# Patient Record
Sex: MALE | Race: WHITE | Employment: OTHER | URBAN - METROPOLITAN AREA
[De-identification: names, ages, dates, MRNs, and addresses within clinical notes are randomized per-mention and may not be internally consistent; named-entity substitution may affect disease eponyms.]

---

## 2017-01-23 RX ORDER — PRAVASTATIN SODIUM 20 MG
TABLET ORAL
Qty: 30 TABLET | Refills: 3 | Status: SHIPPED | OUTPATIENT
Start: 2017-01-23 | End: 2017-06-22

## 2017-01-23 RX ORDER — ESCITALOPRAM OXALATE 10 MG/1
TABLET ORAL
Qty: 90 TABLET | Refills: 3 | Status: SHIPPED | OUTPATIENT
Start: 2017-01-23 | End: 2018-03-12

## 2017-01-23 RX ORDER — LOSARTAN POTASSIUM 50 MG/1
50 TABLET ORAL DAILY
Qty: 90 TABLET | Refills: 0 | Status: SHIPPED | OUTPATIENT
Start: 2017-01-23 | End: 2017-05-05 | Stop reason: ALTCHOICE

## 2017-01-23 NOTE — TELEPHONE ENCOUNTER
Last Office Visit: 6-7-16 with SD for post-op  Last Rx Filled:   Losartan 4-28-16 90 days with no refills   Pravastatin 9-22-16 30 days with 3 refills   Lexapro 9-22-16 90 days with 3 refills   Last Labs: 5-16-16 lipid/bmp  Future Appointment: None     Per

## 2017-01-24 DIAGNOSIS — E78.5 DYSLIPIDEMIA: Primary | ICD-10-CM

## 2017-01-24 RX ORDER — PRAVASTATIN SODIUM 20 MG
TABLET ORAL
Qty: 30 TABLET | Refills: 3 | OUTPATIENT
Start: 2017-01-24

## 2017-01-24 RX ORDER — LOSARTAN POTASSIUM 50 MG/1
TABLET ORAL
Qty: 30 TABLET | Refills: 3 | OUTPATIENT
Start: 2017-01-24

## 2017-01-24 NOTE — TELEPHONE ENCOUNTER
Per protocol; Failed - Route to Provider     Patient is due for an office visit for follow up and repeat lipids   Also routed to  for scheduling a follow up and reminder of labs to be done .

## 2017-01-25 NOTE — TELEPHONE ENCOUNTER
Please clarify with patient. This is the second or third request from [facility]. I saw his wife wanted them sent to [facility] as it was easier to transfer when in [place]. They were sent to [facility].

## 2017-01-25 NOTE — TELEPHONE ENCOUNTER
Patient was left a voicemail to call back and clarify if patient wanted refills to be sent to [facility] , due to traveling reasons or to [address] [place] [address] since requests keep coming in from [address] [place] [address].

## 2017-01-25 NOTE — TELEPHONE ENCOUNTER
Per protocol;  Failed - Route to Provider     Due for repeat lipids as well as scheduling for follow up   Labs pending  Also routed to [employer] for scheduling

## 2017-01-26 ENCOUNTER — TELEPHONE (OUTPATIENT)
Dept: INTERNAL MEDICINE CLINIC | Facility: CLINIC | Age: 67
End: 2017-01-26

## 2017-01-26 DIAGNOSIS — Z00.00 ROUTINE GENERAL MEDICAL EXAMINATION AT A HEALTH CARE FACILITY: Primary | ICD-10-CM

## 2017-01-26 DIAGNOSIS — Z13.0 SCREENING FOR ENDOCRINE, METABOLIC AND IMMUNITY DISORDER: ICD-10-CM

## 2017-01-26 DIAGNOSIS — Z13.0 SCREENING FOR DISORDER OF BLOOD AND BLOOD-FORMING ORGANS: ICD-10-CM

## 2017-01-26 DIAGNOSIS — Z13.29 SCREENING FOR ENDOCRINE, METABOLIC AND IMMUNITY DISORDER: ICD-10-CM

## 2017-01-26 DIAGNOSIS — Z13.220 SCREENING FOR LIPID DISORDERS: ICD-10-CM

## 2017-01-26 DIAGNOSIS — Z13.228 SCREENING FOR ENDOCRINE, METABOLIC AND IMMUNITY DISORDER: ICD-10-CM

## 2017-01-26 DIAGNOSIS — Z13.29 SCREENING FOR THYROID DISORDER: ICD-10-CM

## 2017-01-26 RX ORDER — PRAVASTATIN SODIUM 20 MG
TABLET ORAL
Qty: 30 TABLET | Refills: 3 | OUTPATIENT
Start: 2017-01-26

## 2017-01-26 RX ORDER — LOSARTAN POTASSIUM 50 MG/1
TABLET ORAL
Qty: 30 TABLET | Refills: 0 | OUTPATIENT
Start: 2017-01-26

## 2017-01-27 RX ORDER — LOSARTAN POTASSIUM 50 MG/1
TABLET ORAL
Qty: 30 TABLET | Refills: 0 | OUTPATIENT
Start: 2017-01-27

## 2017-01-27 RX ORDER — PRAVASTATIN SODIUM 20 MG
TABLET ORAL
Qty: 30 TABLET | Refills: 3 | OUTPATIENT
Start: 2017-01-27

## 2017-01-27 NOTE — TELEPHONE ENCOUNTER
Per protocol;  Failed - Route to Provider    Patient is due for Follow up in office before refill and due for Lipids recheck (labs already pending to be completed)

## 2017-01-27 NOTE — TELEPHONE ENCOUNTER
Please see all previous encounters.  3rd or 4th request.   Refused as these were sent to [facility] per patient request.  Please call [facility] and let them know to  Cancel this request.

## 2017-01-27 NOTE — TELEPHONE ENCOUNTER
Future Appointments  Date Time Provider Calixto Lerma   5/1/2017 8:30 AM Mihai Zacarias MD [facility]     Please send all orders to [facility]. Aware to fast, no cb required.

## 2017-04-24 RX ORDER — LOSARTAN POTASSIUM 25 MG/1
TABLET ORAL
Qty: 90 TABLET | Refills: 1 | Status: SHIPPED | OUTPATIENT
Start: 2017-04-24 | End: 2017-10-12

## 2017-05-04 ENCOUNTER — LAB ENCOUNTER (OUTPATIENT)
Dept: LAB | Age: 67
End: 2017-05-04
Attending: INTERNAL MEDICINE
Payer: MEDICARE

## 2017-05-04 DIAGNOSIS — Z13.29 SCREENING FOR THYROID DISORDER: ICD-10-CM

## 2017-05-04 DIAGNOSIS — Z13.0 SCREENING FOR ENDOCRINE, METABOLIC AND IMMUNITY DISORDER: ICD-10-CM

## 2017-05-04 DIAGNOSIS — Z13.220 SCREENING FOR LIPID DISORDERS: ICD-10-CM

## 2017-05-04 DIAGNOSIS — Z13.0 SCREENING FOR DISORDER OF BLOOD AND BLOOD-FORMING ORGANS: ICD-10-CM

## 2017-05-04 DIAGNOSIS — Z13.29 SCREENING FOR ENDOCRINE, METABOLIC AND IMMUNITY DISORDER: ICD-10-CM

## 2017-05-04 DIAGNOSIS — Z00.00 ROUTINE GENERAL MEDICAL EXAMINATION AT A HEALTH CARE FACILITY: ICD-10-CM

## 2017-05-04 DIAGNOSIS — Z13.228 SCREENING FOR ENDOCRINE, METABOLIC AND IMMUNITY DISORDER: ICD-10-CM

## 2017-05-04 PROCEDURE — 84443 ASSAY THYROID STIM HORMONE: CPT

## 2017-05-04 PROCEDURE — 85025 COMPLETE CBC W/AUTO DIFF WBC: CPT

## 2017-05-04 PROCEDURE — 80061 LIPID PANEL: CPT

## 2017-05-04 PROCEDURE — 80053 COMPREHEN METABOLIC PANEL: CPT

## 2017-05-04 PROCEDURE — 36415 COLL VENOUS BLD VENIPUNCTURE: CPT

## 2017-05-05 ENCOUNTER — OFFICE VISIT (OUTPATIENT)
Dept: INTERNAL MEDICINE CLINIC | Facility: CLINIC | Age: 67
End: 2017-05-05

## 2017-05-05 VITALS
BODY MASS INDEX: 30.92 KG/M2 | HEART RATE: 72 BPM | RESPIRATION RATE: 16 BRPM | SYSTOLIC BLOOD PRESSURE: 118 MMHG | DIASTOLIC BLOOD PRESSURE: 70 MMHG | TEMPERATURE: 98 F | WEIGHT: 216 LBS | HEIGHT: 70 IN

## 2017-05-05 DIAGNOSIS — E78.00 HYPERCHOLESTEREMIA: ICD-10-CM

## 2017-05-05 DIAGNOSIS — I10 ESSENTIAL HYPERTENSION: ICD-10-CM

## 2017-05-05 DIAGNOSIS — Z00.00 ROUTINE GENERAL MEDICAL EXAMINATION AT A HEALTH CARE FACILITY: Primary | ICD-10-CM

## 2017-05-05 DIAGNOSIS — Z13.6 SCREENING FOR AAA (ABDOMINAL AORTIC ANEURYSM): ICD-10-CM

## 2017-05-05 PROCEDURE — G0402 INITIAL PREVENTIVE EXAM: HCPCS | Performed by: INTERNAL MEDICINE

## 2017-05-05 PROCEDURE — G0009 ADMIN PNEUMOCOCCAL VACCINE: HCPCS | Performed by: INTERNAL MEDICINE

## 2017-05-05 PROCEDURE — 90670 PCV13 VACCINE IM: CPT | Performed by: INTERNAL MEDICINE

## 2017-05-05 PROCEDURE — 99213 OFFICE O/P EST LOW 20 MIN: CPT | Performed by: INTERNAL MEDICINE

## 2017-05-05 NOTE — PROGRESS NOTES
HPI:    Patient ID: Liana Chu is a 74year old male.     HPI  Here for welcome to medicare, see form, hyperglycemia, htn, hypercholesterolemia, feels well, active, no complaints  /70 mmHg  Pulse 72  Temp(Src) 98.3 °F (36.8 °C) (Oral)  Resp 16 penile tenderness. Musculoskeletal: He exhibits no edema. Neurological: He is oriented to person, place, and time. No cranial nerve deficit. Skin: Skin is warm. He is not diaphoretic. Psychiatric: He has a normal mood and affect.               ASSES

## 2017-06-22 RX ORDER — PRAVASTATIN SODIUM 20 MG
TABLET ORAL
Qty: 30 TABLET | Refills: 5 | Status: SHIPPED | OUTPATIENT
Start: 2017-06-22 | End: 2017-12-18

## 2017-06-23 NOTE — TELEPHONE ENCOUNTER
Pharmacy paged requesting refill now since patient is leaving on a trip. Rx sent to listed pharmacy.

## 2017-08-30 ENCOUNTER — OFFICE VISIT (OUTPATIENT)
Dept: FAMILY MEDICINE CLINIC | Facility: CLINIC | Age: 67
End: 2017-08-30

## 2017-08-30 VITALS
HEIGHT: 70 IN | SYSTOLIC BLOOD PRESSURE: 124 MMHG | TEMPERATURE: 99 F | RESPIRATION RATE: 18 BRPM | BODY MASS INDEX: 30.92 KG/M2 | WEIGHT: 216 LBS | OXYGEN SATURATION: 98 % | HEART RATE: 97 BPM | DIASTOLIC BLOOD PRESSURE: 80 MMHG

## 2017-08-30 DIAGNOSIS — J06.9 VIRAL URI WITH COUGH: Primary | ICD-10-CM

## 2017-08-30 PROCEDURE — 99213 OFFICE O/P EST LOW 20 MIN: CPT | Performed by: NURSE PRACTITIONER

## 2017-08-30 RX ORDER — FLUTICASONE PROPIONATE 50 MCG
1 SPRAY, SUSPENSION (ML) NASAL 2 TIMES DAILY
Qty: 1 BOTTLE | Refills: 1 | Status: SHIPPED | OUTPATIENT
Start: 2017-08-30 | End: 2017-09-29

## 2017-08-30 NOTE — PROGRESS NOTES
HPI:   Noe Kumar is a 74year old male who presents with ill symptoms for  2  days. Patient was on [place] cruise and developed chills/fever and URI symptoms while on the boat.  He improved with Robitussin and rest. Today he has mild nonproductive c • Cancer Mother    • Heart Attack Mother    • Breast Cancer Sister       Smoking status: Former Smoker                                                              Packs/day: 0.00      Years: 0.00         Quit date: 5/20/1987  Smokeless tobacco: Never Us blood pressure during use. · Salt water gargles (1 tsp. Salt in 6 oz lukewarm water), use several times daily to help decrease swelling and pain. · Saline nasal spray to nostrils if needed to help remove drainage or congestion in nose.    · Hydrate! (cold

## 2017-08-30 NOTE — PATIENT INSTRUCTIONS
Please start Flonase 1 spray each nostril twice daily before brushing teeth. Use for at least one week. May stop if improving. Restart if symptoms return. · May continue Robitussin if needed for cough. Watch blood pressure during use.   · Salt water gargl

## 2017-09-05 ENCOUNTER — OFFICE VISIT (OUTPATIENT)
Dept: INTERNAL MEDICINE CLINIC | Facility: CLINIC | Age: 67
End: 2017-09-05

## 2017-09-05 VITALS
WEIGHT: 213 LBS | HEIGHT: 69 IN | DIASTOLIC BLOOD PRESSURE: 82 MMHG | SYSTOLIC BLOOD PRESSURE: 130 MMHG | BODY MASS INDEX: 31.55 KG/M2 | TEMPERATURE: 99 F | OXYGEN SATURATION: 98 % | HEART RATE: 81 BPM

## 2017-09-05 DIAGNOSIS — R05.9 COUGH: ICD-10-CM

## 2017-09-05 DIAGNOSIS — J06.9 ACUTE URI: Primary | ICD-10-CM

## 2017-09-05 PROCEDURE — 99213 OFFICE O/P EST LOW 20 MIN: CPT | Performed by: NURSE PRACTITIONER

## 2017-09-05 RX ORDER — AZITHROMYCIN 250 MG/1
TABLET, FILM COATED ORAL
Qty: 6 TABLET | Refills: 0 | Status: SHIPPED | OUTPATIENT
Start: 2017-09-05 | End: 2017-12-20 | Stop reason: ALTCHOICE

## 2017-09-05 RX ORDER — PREDNISONE 20 MG/1
20 TABLET ORAL DAILY
Qty: 5 TABLET | Refills: 0 | Status: SHIPPED | OUTPATIENT
Start: 2017-09-05 | End: 2017-09-12

## 2017-09-05 NOTE — PROGRESS NOTES
Marichuy Reyna is a 74year old male. Patient presents with:  Cough: for about 1 week. He went to walk in clinic and was given flonase and was told to take delsym otc but its not helping       HPI:   Presents for eval of cough and chest congestion.   See tablet Rfl: 3   aspirin 81 MG Oral Tab EC Take 81 mg by mouth daily.  Disp:  Rfl:       Past Medical History:   Diagnosis Date   • Actinic keratosis    • ANXIETY    • Anxiety state, unspecified    • BACK PAIN    • Basal cell carcinoma    • Bronchitis, not s respiratory distress, lungs clear to auscultation   Slight end expiratory wheezing. CARDIO: RRR without murmur  PSYCH: alert and oriented x 3    ASSESSMENT AND PLAN:     Acute uri  (primary encounter diagnosis)  zithromax and prednisone.   Call with unre

## 2017-10-13 RX ORDER — LOSARTAN POTASSIUM 25 MG/1
TABLET ORAL
Qty: 90 TABLET | Refills: 0 | Status: SHIPPED | OUTPATIENT
Start: 2017-10-13 | End: 2017-12-18

## 2017-12-18 RX ORDER — LOSARTAN POTASSIUM 25 MG/1
TABLET ORAL
Qty: 90 TABLET | Refills: 1 | Status: SHIPPED | OUTPATIENT
Start: 2017-12-18 | End: 2018-06-09

## 2017-12-18 RX ORDER — PRAVASTATIN SODIUM 20 MG
TABLET ORAL
Qty: 30 TABLET | Refills: 5 | Status: SHIPPED | OUTPATIENT
Start: 2017-12-18 | End: 2017-12-19

## 2017-12-19 ENCOUNTER — TELEPHONE (OUTPATIENT)
Dept: INTERNAL MEDICINE CLINIC | Facility: CLINIC | Age: 67
End: 2017-12-19

## 2017-12-19 RX ORDER — PRAVASTATIN SODIUM 20 MG
20 TABLET ORAL NIGHTLY
Qty: 90 TABLET | Refills: 1 | Status: SHIPPED | OUTPATIENT
Start: 2017-12-19 | End: 2018-12-12

## 2017-12-19 NOTE — TELEPHONE ENCOUNTER
Refills for pravastatin was filled 12-18-17 30 tabs with 5 refills so I resent it as 90 days with 1 refill

## 2017-12-19 NOTE — TELEPHONE ENCOUNTER
Patient is scheduled for an appt with Shashi Maddox, he would like to cancel that appt. but he would like to increase his   escitalopram 10 MG Oral Tab.   I told him that I thought that he would need to see a Dr in order to do that but he would like

## 2017-12-20 NOTE — PROGRESS NOTES
Patient presents with:  Medication Follow-Up: Room 2. He would like to talk about getting off the lexapro.  He was taking it while he was working and had some stress and he is no longer working so he would like to stop it      HPI:  Pt presents with c/o inc claudication     Thoracic or lumbosacral neuritis or radiculitis, unspecified     Encounter for long-term (current) use of other medications     Diverticulosis of colon (without mention of hemorrhage)     Sciatica     Allergic rhinitis, cause unspecified anxiety. At that point he will need to return and we will need to restart Lexapro or change med or increase dose. Pt voiced understanding. Essential hypertension - Controlled. Continue current meds. Hypercholesteremia - Recheck labs.   Continue Pravast

## 2017-12-20 NOTE — PATIENT INSTRUCTIONS
Take 1/2 tablet of Lexapro (5 mg) daily for 2 weeks, then take 1/2 tablet of Lexapro every other day for 2 weeks then STOP.

## 2018-01-22 ENCOUNTER — LAB ENCOUNTER (OUTPATIENT)
Dept: LAB | Age: 68
End: 2018-01-22
Attending: PHYSICIAN ASSISTANT
Payer: MEDICARE

## 2018-01-22 DIAGNOSIS — R73.9 HYPERGLYCEMIA: Primary | ICD-10-CM

## 2018-01-22 DIAGNOSIS — R45.1 AGITATION: ICD-10-CM

## 2018-01-22 DIAGNOSIS — R73.9 HYPERGLYCEMIA: ICD-10-CM

## 2018-01-22 DIAGNOSIS — F41.9 ANXIETY: ICD-10-CM

## 2018-01-22 DIAGNOSIS — I10 ESSENTIAL HYPERTENSION: ICD-10-CM

## 2018-01-22 DIAGNOSIS — E78.00 HYPERCHOLESTEREMIA: ICD-10-CM

## 2018-01-22 LAB
ALBUMIN SERPL-MCNC: 3.9 G/DL (ref 3.5–4.8)
ALP LIVER SERPL-CCNC: 55 U/L (ref 45–117)
ALT SERPL-CCNC: 33 U/L (ref 17–63)
AST SERPL-CCNC: 20 U/L (ref 15–41)
BASOPHILS # BLD AUTO: 0.05 X10(3) UL (ref 0–0.1)
BASOPHILS NFR BLD AUTO: 0.9 %
BILIRUB SERPL-MCNC: 0.5 MG/DL (ref 0.1–2)
BUN BLD-MCNC: 16 MG/DL (ref 8–20)
CALCIUM BLD-MCNC: 8.7 MG/DL (ref 8.3–10.3)
CHLORIDE: 106 MMOL/L (ref 101–111)
CHOLEST SMN-MCNC: 199 MG/DL (ref ?–200)
CO2: 30 MMOL/L (ref 22–32)
CREAT BLD-MCNC: 0.87 MG/DL (ref 0.7–1.3)
EOSINOPHIL # BLD AUTO: 0.24 X10(3) UL (ref 0–0.3)
EOSINOPHIL NFR BLD AUTO: 4.3 %
ERYTHROCYTE [DISTWIDTH] IN BLOOD BY AUTOMATED COUNT: 11.9 % (ref 11.5–16)
EST. AVERAGE GLUCOSE BLD GHB EST-MCNC: 105 MG/DL (ref 68–126)
GLUCOSE BLD-MCNC: 100 MG/DL (ref 70–99)
HBA1C MFR BLD HPLC: 5.3 % (ref ?–5.7)
HCT VFR BLD AUTO: 42.1 % (ref 37–53)
HDLC SERPL-MCNC: 53 MG/DL (ref 45–?)
HDLC SERPL: 3.75 {RATIO} (ref ?–4.97)
HGB BLD-MCNC: 14.9 G/DL (ref 13–17)
IMMATURE GRANULOCYTE COUNT: 0.02 X10(3) UL (ref 0–1)
IMMATURE GRANULOCYTE RATIO %: 0.4 %
LDLC SERPL CALC-MCNC: 124 MG/DL (ref ?–130)
LYMPHOCYTES # BLD AUTO: 2.02 X10(3) UL (ref 0.9–4)
LYMPHOCYTES NFR BLD AUTO: 36.1 %
M PROTEIN MFR SERPL ELPH: 6.9 G/DL (ref 6.1–8.3)
MCH RBC QN AUTO: 33.7 PG (ref 27–33.2)
MCHC RBC AUTO-ENTMCNC: 35.4 G/DL (ref 31–37)
MCV RBC AUTO: 95.2 FL (ref 80–99)
MONOCYTES # BLD AUTO: 0.61 X10(3) UL (ref 0.1–0.6)
MONOCYTES NFR BLD AUTO: 10.9 %
NEUTROPHIL ABS PRELIM: 2.65 X10 (3) UL (ref 1.3–6.7)
NEUTROPHILS # BLD AUTO: 2.65 X10(3) UL (ref 1.3–6.7)
NEUTROPHILS NFR BLD AUTO: 47.4 %
NONHDLC SERPL-MCNC: 146 MG/DL (ref ?–130)
PLATELET # BLD AUTO: 241 10(3)UL (ref 150–450)
POTASSIUM SERPL-SCNC: 4.3 MMOL/L (ref 3.6–5.1)
RBC # BLD AUTO: 4.42 X10(6)UL (ref 3.8–5.8)
RED CELL DISTRIBUTION WIDTH-SD: 41.4 FL (ref 35.1–46.3)
SODIUM SERPL-SCNC: 143 MMOL/L (ref 136–144)
TRIGL SERPL-MCNC: 110 MG/DL (ref ?–150)
TSI SER-ACNC: 2.78 MIU/ML (ref 0.35–5.5)
VLDLC SERPL CALC-MCNC: 22 MG/DL (ref 5–40)
WBC # BLD AUTO: 5.6 X10(3) UL (ref 4–13)

## 2018-01-22 PROCEDURE — 84443 ASSAY THYROID STIM HORMONE: CPT

## 2018-01-22 PROCEDURE — 36415 COLL VENOUS BLD VENIPUNCTURE: CPT

## 2018-01-22 PROCEDURE — 85025 COMPLETE CBC W/AUTO DIFF WBC: CPT

## 2018-01-22 PROCEDURE — 80053 COMPREHEN METABOLIC PANEL: CPT

## 2018-01-22 PROCEDURE — 83036 HEMOGLOBIN GLYCOSYLATED A1C: CPT

## 2018-01-22 PROCEDURE — 80061 LIPID PANEL: CPT

## 2018-01-22 NOTE — PROGRESS NOTES
TSH normal.  CBC OK. CMP OK other than mildly elevated gluc. Chol stable. A1c added to blood in lab. Will comment on A1c when result known.

## 2018-01-22 NOTE — PROGRESS NOTES
A1c is normal.  Therefore despite mild elevated in fasting blood sugar his 3 mos average blood sugar is normal.

## 2018-01-23 ENCOUNTER — TELEPHONE (OUTPATIENT)
Dept: INTERNAL MEDICINE CLINIC | Facility: CLINIC | Age: 68
End: 2018-01-23

## 2018-01-23 NOTE — TELEPHONE ENCOUNTER
Patient called and would like his lab results documented to my chart. He just signed on yesterday. He did receive his results via phone. He would also like to  a hard copy because he is going out of town.   I have copied them and put them up fro

## 2018-03-12 RX ORDER — ESCITALOPRAM OXALATE 10 MG/1
TABLET ORAL
Qty: 90 TABLET | Refills: 0 | Status: SHIPPED | OUTPATIENT
Start: 2018-03-12 | End: 2018-06-09

## 2018-03-12 NOTE — TELEPHONE ENCOUNTER
Medication(s) to Refill:   Pending Prescriptions Disp Refills    ESCITALOPRAM 10 MG Oral Tab [Pharmacy Med Name: ESCITALOPRAM 10MG TABLETS] 90 tablet 0     Sig: TAKE 1 TABLET(10 MG) BY MOUTH EVERY DAY             Reason for Medication Refill being sent to

## 2018-03-20 ENCOUNTER — APPOINTMENT (OUTPATIENT)
Dept: GENERAL RADIOLOGY | Age: 68
End: 2018-03-20
Payer: MEDICARE

## 2018-03-20 ENCOUNTER — HOSPITAL ENCOUNTER (EMERGENCY)
Age: 68
Discharge: HOME | End: 2018-03-20
Attending: EMERGENCY MEDICINE
Payer: MEDICARE

## 2018-03-20 ENCOUNTER — APPOINTMENT (OUTPATIENT)
Dept: CV DIAGNOSTICS | Age: 68
End: 2018-03-20
Attending: EMERGENCY MEDICINE
Payer: MEDICARE

## 2018-03-20 VITALS
SYSTOLIC BLOOD PRESSURE: 133 MMHG | BODY MASS INDEX: 31.1 KG/M2 | TEMPERATURE: 98 F | WEIGHT: 210 LBS | HEART RATE: 77 BPM | DIASTOLIC BLOOD PRESSURE: 77 MMHG | RESPIRATION RATE: 18 BRPM | HEIGHT: 69 IN | OXYGEN SATURATION: 99 %

## 2018-03-20 DIAGNOSIS — R07.89 CHEST PAIN, ATYPICAL: Primary | ICD-10-CM

## 2018-03-20 LAB
ALBUMIN SERPL-MCNC: 4.2 G/DL (ref 3.5–4.8)
ALP LIVER SERPL-CCNC: 57 U/L (ref 45–117)
ALT SERPL-CCNC: 28 U/L (ref 17–63)
AST SERPL-CCNC: 21 U/L (ref 15–41)
ATRIAL RATE: 71 BPM
ATRIAL RATE: 72 BPM
BASOPHILS # BLD AUTO: 0.04 X10(3) UL (ref 0–0.1)
BASOPHILS NFR BLD AUTO: 0.6 %
BILIRUB SERPL-MCNC: 0.6 MG/DL (ref 0.1–2)
BUN BLD-MCNC: 15 MG/DL (ref 8–20)
CALCIUM BLD-MCNC: 8.7 MG/DL (ref 8.3–10.3)
CHLORIDE: 102 MMOL/L (ref 101–111)
CO2: 31 MMOL/L (ref 22–32)
CREAT BLD-MCNC: 0.86 MG/DL (ref 0.7–1.3)
EOSINOPHIL # BLD AUTO: 0.19 X10(3) UL (ref 0–0.3)
EOSINOPHIL NFR BLD AUTO: 2.9 %
ERYTHROCYTE [DISTWIDTH] IN BLOOD BY AUTOMATED COUNT: 12.1 % (ref 11.5–16)
GLUCOSE BLD-MCNC: 99 MG/DL (ref 70–99)
HCT VFR BLD AUTO: 43.8 % (ref 37–53)
HGB BLD-MCNC: 15.6 G/DL (ref 13–17)
IMMATURE GRANULOCYTE COUNT: 0.01 X10(3) UL (ref 0–1)
IMMATURE GRANULOCYTE RATIO %: 0.2 %
LYMPHOCYTES # BLD AUTO: 2.22 X10(3) UL (ref 0.9–4)
LYMPHOCYTES NFR BLD AUTO: 33.5 %
M PROTEIN MFR SERPL ELPH: 7.4 G/DL (ref 6.1–8.3)
MCH RBC QN AUTO: 34.1 PG (ref 27–33.2)
MCHC RBC AUTO-ENTMCNC: 35.6 G/DL (ref 31–37)
MCV RBC AUTO: 95.6 FL (ref 80–99)
MONOCYTES # BLD AUTO: 0.72 X10(3) UL (ref 0.1–1)
MONOCYTES NFR BLD AUTO: 10.9 %
NEUTROPHIL ABS PRELIM: 3.45 X10 (3) UL (ref 1.3–6.7)
NEUTROPHILS # BLD AUTO: 3.45 X10(3) UL (ref 1.3–6.7)
NEUTROPHILS NFR BLD AUTO: 51.9 %
P AXIS: 31 DEGREES
P AXIS: 31 DEGREES
P-R INTERVAL: 156 MS
P-R INTERVAL: 158 MS
PLATELET # BLD AUTO: 264 10(3)UL (ref 150–450)
POTASSIUM SERPL-SCNC: 4.3 MMOL/L (ref 3.6–5.1)
Q-T INTERVAL: 398 MS
Q-T INTERVAL: 404 MS
QRS DURATION: 102 MS
QRS DURATION: 102 MS
QTC CALCULATION (BEZET): 435 MS
QTC CALCULATION (BEZET): 439 MS
R AXIS: -41 DEGREES
R AXIS: -43 DEGREES
RBC # BLD AUTO: 4.58 X10(6)UL (ref 3.8–5.8)
RED CELL DISTRIBUTION WIDTH-SD: 41.5 FL (ref 35.1–46.3)
SODIUM SERPL-SCNC: 138 MMOL/L (ref 136–144)
T AXIS: -3 DEGREES
T AXIS: 21 DEGREES
TROPONIN: <0.046 NG/ML (ref ?–0.05)
TROPONIN: <0.046 NG/ML (ref ?–0.05)
VENTRICULAR RATE: 71 BPM
VENTRICULAR RATE: 72 BPM
WBC # BLD AUTO: 6.6 X10(3) UL (ref 4–13)

## 2018-03-20 PROCEDURE — 80053 COMPREHEN METABOLIC PANEL: CPT | Performed by: EMERGENCY MEDICINE

## 2018-03-20 PROCEDURE — 93017 CV STRESS TEST TRACING ONLY: CPT | Performed by: EMERGENCY MEDICINE

## 2018-03-20 PROCEDURE — 84484 ASSAY OF TROPONIN QUANT: CPT | Performed by: EMERGENCY MEDICINE

## 2018-03-20 PROCEDURE — 93010 ELECTROCARDIOGRAM REPORT: CPT

## 2018-03-20 PROCEDURE — 80053 COMPREHEN METABOLIC PANEL: CPT

## 2018-03-20 PROCEDURE — 71045 X-RAY EXAM CHEST 1 VIEW: CPT | Performed by: EMERGENCY MEDICINE

## 2018-03-20 PROCEDURE — 99285 EMERGENCY DEPT VISIT HI MDM: CPT

## 2018-03-20 PROCEDURE — 84484 ASSAY OF TROPONIN QUANT: CPT

## 2018-03-20 PROCEDURE — 96374 THER/PROPH/DIAG INJ IV PUSH: CPT

## 2018-03-20 PROCEDURE — 85025 COMPLETE CBC W/AUTO DIFF WBC: CPT

## 2018-03-20 PROCEDURE — 93350 STRESS TTE ONLY: CPT | Performed by: EMERGENCY MEDICINE

## 2018-03-20 PROCEDURE — 93005 ELECTROCARDIOGRAM TRACING: CPT

## 2018-03-20 PROCEDURE — 93018 CV STRESS TEST I&R ONLY: CPT | Performed by: EMERGENCY MEDICINE

## 2018-03-20 PROCEDURE — 85025 COMPLETE CBC W/AUTO DIFF WBC: CPT | Performed by: EMERGENCY MEDICINE

## 2018-03-20 RX ORDER — KETOROLAC TROMETHAMINE 30 MG/ML
15 INJECTION, SOLUTION INTRAMUSCULAR; INTRAVENOUS ONCE
Status: COMPLETED | OUTPATIENT
Start: 2018-03-20 | End: 2018-03-20

## 2018-03-20 RX ORDER — ASPIRIN 81 MG/1
162 TABLET, CHEWABLE ORAL ONCE
Status: COMPLETED | OUTPATIENT
Start: 2018-03-20 | End: 2018-03-20

## 2018-03-20 NOTE — PROGRESS NOTES
PRELIMINARY CARDIODIAGNOSTICS REPORT    No ST segment changes noted with exercise. No symptoms. Rare PACs, frequent PVCs with couplets and bigeminal at times. Pt walked for 7:15 on a Timothy protocol achieving a MHR of 134 (87% APMHR). Peak /100.

## 2018-05-16 ENCOUNTER — HOSPITAL ENCOUNTER (OUTPATIENT)
Dept: CT IMAGING | Age: 68
Discharge: HOME | End: 2018-05-16
Attending: INTERNAL MEDICINE

## 2018-05-16 DIAGNOSIS — Z13.9 VISIT FOR SCREENING: ICD-10-CM

## 2018-05-17 ENCOUNTER — TELEPHONE (OUTPATIENT)
Dept: INTERNAL MEDICINE CLINIC | Facility: CLINIC | Age: 68
End: 2018-05-17

## 2018-05-17 ENCOUNTER — HOSPITAL ENCOUNTER (OUTPATIENT)
Dept: CT IMAGING | Facility: HOSPITAL | Age: 68
Discharge: HOME | End: 2018-05-17
Attending: INTERNAL MEDICINE
Payer: MEDICARE

## 2018-05-17 DIAGNOSIS — R93.89 ABNORMAL FINDING ON CT SCAN: ICD-10-CM

## 2018-05-17 PROCEDURE — 71260 CT THORAX DX C+: CPT | Performed by: INTERNAL MEDICINE

## 2018-05-17 PROCEDURE — 82565 ASSAY OF CREATININE: CPT

## 2018-05-17 NOTE — TELEPHONE ENCOUNTER
Patient states he has the CT Chest scheduled for tonight 5/17 and wanted to talk through whether the CT was needed. Discussed his CT overread results. Pt determined he would go ahead and proceed with the CT Chest tonight.   Pt verbalizes understanding and

## 2018-05-17 NOTE — TELEPHONE ENCOUNTER
Pt called and stated that Mp Aldana has him scheduled for a F/U CT Scan of the chest. Pt would like to speak to JL. I informed pt that he would first get a call back from the nurse and he said that he already spoke with the nurse and wants to speak to JL.      Ple

## 2018-05-18 DIAGNOSIS — R73.9 HYPERGLYCEMIA: Primary | ICD-10-CM

## 2018-05-29 ENCOUNTER — LAB ENCOUNTER (OUTPATIENT)
Dept: LAB | Age: 68
End: 2018-05-29
Attending: INTERNAL MEDICINE
Payer: MEDICARE

## 2018-05-29 DIAGNOSIS — R73.9 HYPERGLYCEMIA: ICD-10-CM

## 2018-05-29 PROCEDURE — 36415 COLL VENOUS BLD VENIPUNCTURE: CPT

## 2018-05-29 PROCEDURE — 82947 ASSAY GLUCOSE BLOOD QUANT: CPT

## 2018-05-29 PROCEDURE — 83036 HEMOGLOBIN GLYCOSYLATED A1C: CPT

## 2018-05-30 ENCOUNTER — OFFICE VISIT (OUTPATIENT)
Dept: INTERNAL MEDICINE CLINIC | Facility: CLINIC | Age: 68
End: 2018-05-30

## 2018-05-30 VITALS
WEIGHT: 199.81 LBS | RESPIRATION RATE: 17 BRPM | TEMPERATURE: 98 F | SYSTOLIC BLOOD PRESSURE: 120 MMHG | HEART RATE: 92 BPM | DIASTOLIC BLOOD PRESSURE: 86 MMHG | HEIGHT: 69 IN | BODY MASS INDEX: 29.59 KG/M2

## 2018-05-30 DIAGNOSIS — Z23 NEED FOR VACCINATION: ICD-10-CM

## 2018-05-30 DIAGNOSIS — Z00.00 ROUTINE GENERAL MEDICAL EXAMINATION AT A HEALTH CARE FACILITY: Primary | ICD-10-CM

## 2018-05-30 DIAGNOSIS — Z12.11 SPECIAL SCREENING FOR MALIGNANT NEOPLASMS, COLON: ICD-10-CM

## 2018-05-30 DIAGNOSIS — F41.9 ANXIETY: ICD-10-CM

## 2018-05-30 DIAGNOSIS — I10 ESSENTIAL HYPERTENSION, BENIGN: ICD-10-CM

## 2018-05-30 DIAGNOSIS — E78.00 PURE HYPERCHOLESTEROLEMIA: ICD-10-CM

## 2018-05-30 DIAGNOSIS — Z12.5 PROSTATE CANCER SCREENING: ICD-10-CM

## 2018-05-30 DIAGNOSIS — R91.1 LUNG NODULE < 6CM ON CT: ICD-10-CM

## 2018-05-30 PROCEDURE — 99213 OFFICE O/P EST LOW 20 MIN: CPT | Performed by: INTERNAL MEDICINE

## 2018-05-30 PROCEDURE — G0009 ADMIN PNEUMOCOCCAL VACCINE: HCPCS | Performed by: INTERNAL MEDICINE

## 2018-05-30 PROCEDURE — G0438 PPPS, INITIAL VISIT: HCPCS | Performed by: INTERNAL MEDICINE

## 2018-05-30 PROCEDURE — 90732 PPSV23 VACC 2 YRS+ SUBQ/IM: CPT | Performed by: INTERNAL MEDICINE

## 2018-06-01 NOTE — PROGRESS NOTES
HPI:    Patient ID: Pratik Oviedo is a 79year old male.     HPI  Here for pe, htn, hyperchol, depression controlled, feels well, has worked on wt loss and diet change  /86 (BP Location: Right arm, Patient Position: Sitting, Cuff Size: adult)   Pul tenderness. Genitourinary: Prostate normal and penis normal.   Musculoskeletal: He exhibits no edema. Neurological: He is oriented to person, place, and time. Skin: Skin is warm. He is not diaphoretic. Psychiatric: He has a normal mood and affect.

## 2018-06-11 RX ORDER — LOSARTAN POTASSIUM 25 MG/1
TABLET ORAL
Qty: 90 TABLET | Refills: 0 | Status: SHIPPED | OUTPATIENT
Start: 2018-06-11 | End: 2018-10-10

## 2018-06-11 RX ORDER — ESCITALOPRAM OXALATE 10 MG/1
TABLET ORAL
Qty: 90 TABLET | Refills: 0 | Status: SHIPPED | OUTPATIENT
Start: 2018-06-11 | End: 2018-09-10

## 2018-06-11 NOTE — TELEPHONE ENCOUNTER
Last Office Visit: 5-30-18 with DIANE for CPE  Last Rx Filled: 3-12-18 90 tabs with no refills   Last Labs: 5-29-18 hga1c/glucose  Future Appointment: none    Per protocol to provider

## 2018-06-12 RX ORDER — POLYETHYLENE GLYCOL 3350, SODIUM CHLORIDE, SODIUM BICARBONATE, POTASSIUM CHLORIDE 420; 11.2; 5.72; 1.48 G/4L; G/4L; G/4L; G/4L
POWDER, FOR SOLUTION ORAL
Qty: 1 BOTTLE | Refills: 0 | Status: SHIPPED | OUTPATIENT
Start: 2018-06-12 | End: 2018-08-06 | Stop reason: ALTCHOICE

## 2018-06-19 RX ORDER — LOSARTAN POTASSIUM 25 MG/1
TABLET ORAL
Qty: 90 TABLET | Refills: 0 | OUTPATIENT
Start: 2018-06-19

## 2018-08-06 ENCOUNTER — OFFICE VISIT (OUTPATIENT)
Dept: FAMILY MEDICINE CLINIC | Facility: CLINIC | Age: 68
End: 2018-08-06
Payer: MEDICARE

## 2018-08-06 VITALS
HEIGHT: 68 IN | DIASTOLIC BLOOD PRESSURE: 72 MMHG | BODY MASS INDEX: 29.7 KG/M2 | WEIGHT: 196 LBS | RESPIRATION RATE: 18 BRPM | OXYGEN SATURATION: 98 % | SYSTOLIC BLOOD PRESSURE: 118 MMHG | HEART RATE: 77 BPM | TEMPERATURE: 98 F

## 2018-08-06 DIAGNOSIS — H60.502 ACUTE OTITIS EXTERNA OF LEFT EAR, UNSPECIFIED TYPE: ICD-10-CM

## 2018-08-06 DIAGNOSIS — H61.22 IMPACTED CERUMEN OF LEFT EAR: Primary | ICD-10-CM

## 2018-08-06 PROCEDURE — 69209 REMOVE IMPACTED EAR WAX UNI: CPT | Performed by: NURSE PRACTITIONER

## 2018-08-06 PROCEDURE — 99213 OFFICE O/P EST LOW 20 MIN: CPT | Performed by: NURSE PRACTITIONER

## 2018-08-06 NOTE — PROGRESS NOTES
CHIEF COMPLAINT:   Patient presents with:  Ear Pain: left ear pain x 1 day     HPI:   Clarita Johnson is a 79year old male who presents to clinic today with complaints of left ear pain. Has had for 1 days. Pain is described as discomfort.  Patient denie LUNGS: No shortness of breath, or wheezing. CARDIOVASCULAR: No chest pain, palpitations  GI: No N/V/C/D.   NEURO: denies headaches or dizziness    EXAM:   /72 (BP Location: Right arm, Patient Position: Sitting, Cuff Size: adult)   Pulse 77   Temp 98 Acute otitis externa of left ear, unspecified type  1. Impacted cerumen of left ear  Can use debrox prn. Keep ears dry.      2. Acute otitis externa of left ear, unspecified type  - Neomycin-Polymyxin-HC 3.5-29233-5 Otic Solution; Place 4 drops into the lef Tiny glands in your ear make substances that combine with dead skin cells to form earwax. Earwax helps protect your ear canal from water, dirt, infection, and injury.  Over time, earwax travels from the inner part of your ear canal to the entrance of the ca If you don’t have symptoms, you may not need treatment. Often, the earwax goes away on its own with time. If you have symptoms, you may have one or more treatments such as:  · Eardrops to soften the earwax. This helps it leave the ear over time.   · Rinsing What is this medicine? CARBAMIDE PEROXIDE (CAR parsons mide per OX aurora) is used to soften and help remove ear wax. How should I use this medicine? This medicine is only for use in the outer ear canal. Follow the directions carefully.  Wash hands before and a If you miss a dose, use it as soon as you can. If it is almost time for your next dose, use only that dose. Do not use double or extra doses. Where should I keep my medicine? Keep out of the reach of children.   Store at room temperature between 15 and 30

## 2018-08-06 NOTE — PATIENT INSTRUCTIONS
Impacted Earwax     Inner ear structures including ear canal and eardrum. Impacted earwax is a buildup of the natural wax in the ear (cerumen). Impacted earwax is very common. It can cause symptoms such as hearing loss.  It can also make it difficult Excess earwax usually does not cause any symptoms, unless there is a large amount of buildup.  Then it may cause symptoms such as:  · Hearing loss  · Earache  · Sense of ear fullness  · Itching in the ear  · Odor from the ear  · Ear drainage  · Dizziness  · © 0580-6414 [employer] [address]. [address] [facility], [address]. All rights reserved. This information is not intended as a substitute for professional medical care. Always follow your healthcare professional's instructions.         Chidi Cortes · abnormal sensation while putting the drops in the ear  · temporary reduction in hearing (but not complete loss of hearing)  What may interact with this medicine? Interactions are not expected.  Do not use any other ear products without asking your doctor

## 2018-08-30 ENCOUNTER — APPOINTMENT (OUTPATIENT)
Dept: LAB | Age: 68
End: 2018-08-30
Attending: INTERNAL MEDICINE
Payer: MEDICARE

## 2018-08-30 DIAGNOSIS — Z12.5 PROSTATE CANCER SCREENING: ICD-10-CM

## 2018-08-30 LAB — COMPLEXED PSA SERPL-MCNC: 0.97 NG/ML (ref 0.01–4)

## 2018-08-30 PROCEDURE — 36415 COLL VENOUS BLD VENIPUNCTURE: CPT

## 2018-09-10 RX ORDER — ESCITALOPRAM OXALATE 10 MG/1
TABLET ORAL
Qty: 90 TABLET | Refills: 3 | Status: SHIPPED | OUTPATIENT
Start: 2018-09-10 | End: 2019-06-03

## 2018-09-10 NOTE — TELEPHONE ENCOUNTER
LOV-5/30/18 JL  FOV-none  LAST RX-6/11/18 90 tabs 0 refills  LAST LABS-1/22/18  PER PROTOCOL-to provider

## 2018-09-12 ENCOUNTER — TELEPHONE (OUTPATIENT)
Dept: SURGERY | Facility: CLINIC | Age: 68
End: 2018-09-12

## 2018-09-12 NOTE — TELEPHONE ENCOUNTER
Pt is requesting tablets for colonoscopy prep rather than drink  Patient has HX of HTN. Pt currently on losartan. States has had 6 colonoscopies and has always taken the pills.   Will speak to [address] regarding

## 2018-09-17 NOTE — TELEPHONE ENCOUNTER
Spoke with Dr. Catrina Ruby which stated if patient has had in the past than its fine to administer. Patient has had the tablets in the past for colonoscopies. Per Bony medina for patient to have osmo tablets for upcoming colonoscopy.

## 2018-09-26 ENCOUNTER — TELEPHONE (OUTPATIENT)
Dept: SURGERY | Facility: CLINIC | Age: 68
End: 2018-09-26

## 2018-09-26 NOTE — TELEPHONE ENCOUNTER
Called to inform patient that the osmoprep is not covered under his insurance and offered to order a different prep. Pt will check with pharmacy as to cost and call back if he wants a different prep.

## 2018-10-10 RX ORDER — LOSARTAN POTASSIUM 25 MG/1
TABLET ORAL
Qty: 90 TABLET | Refills: 0 | Status: SHIPPED | OUTPATIENT
Start: 2018-10-10 | End: 2019-04-15

## 2018-10-13 ENCOUNTER — APPOINTMENT (OUTPATIENT)
Dept: GENERAL RADIOLOGY | Age: 68
End: 2018-10-13
Attending: EMERGENCY MEDICINE
Payer: MEDICARE

## 2018-10-13 ENCOUNTER — HOSPITAL ENCOUNTER (EMERGENCY)
Age: 68
Discharge: HOME | End: 2018-10-13
Attending: EMERGENCY MEDICINE
Payer: MEDICARE

## 2018-10-13 VITALS
SYSTOLIC BLOOD PRESSURE: 123 MMHG | RESPIRATION RATE: 20 BRPM | HEIGHT: 68 IN | WEIGHT: 195 LBS | DIASTOLIC BLOOD PRESSURE: 68 MMHG | OXYGEN SATURATION: 97 % | TEMPERATURE: 98 F | BODY MASS INDEX: 29.55 KG/M2 | HEART RATE: 77 BPM

## 2018-10-13 DIAGNOSIS — K21.9 GASTROESOPHAGEAL REFLUX DISEASE, ESOPHAGITIS PRESENCE NOT SPECIFIED: ICD-10-CM

## 2018-10-13 DIAGNOSIS — R07.89 CHEST PAIN, ATYPICAL: Primary | ICD-10-CM

## 2018-10-13 PROCEDURE — 36415 COLL VENOUS BLD VENIPUNCTURE: CPT

## 2018-10-13 PROCEDURE — 93005 ELECTROCARDIOGRAM TRACING: CPT

## 2018-10-13 PROCEDURE — 99285 EMERGENCY DEPT VISIT HI MDM: CPT

## 2018-10-13 PROCEDURE — 80053 COMPREHEN METABOLIC PANEL: CPT | Performed by: EMERGENCY MEDICINE

## 2018-10-13 PROCEDURE — 93010 ELECTROCARDIOGRAM REPORT: CPT

## 2018-10-13 PROCEDURE — 71046 X-RAY EXAM CHEST 2 VIEWS: CPT | Performed by: EMERGENCY MEDICINE

## 2018-10-13 PROCEDURE — 85025 COMPLETE CBC W/AUTO DIFF WBC: CPT | Performed by: EMERGENCY MEDICINE

## 2018-10-13 PROCEDURE — 85610 PROTHROMBIN TIME: CPT | Performed by: EMERGENCY MEDICINE

## 2018-10-13 PROCEDURE — 84484 ASSAY OF TROPONIN QUANT: CPT | Performed by: EMERGENCY MEDICINE

## 2018-10-13 PROCEDURE — 83690 ASSAY OF LIPASE: CPT | Performed by: EMERGENCY MEDICINE

## 2018-10-13 RX ORDER — OMEPRAZOLE 20 MG/1
20 CAPSULE, DELAYED RELEASE ORAL DAILY
Qty: 30 CAPSULE | Refills: 0 | Status: SHIPPED | OUTPATIENT
Start: 2018-10-13 | End: 2018-11-12

## 2018-10-13 NOTE — ED PROVIDER NOTES
Patient Seen in: THE [facility] In [place]    History   Patient presents with:  Chest Pain Angina (cardiovascular)    Stated Complaint: CHEST PAIN     HPI    51-year-old male presents emergency room with chief complaint of chest pain.   Pa Performed by Nic Gruber MD at [address] [facility] OR   • BACK SURGERY  5/31/16    L5-S1 ALIF   • COLONOSCOPY  12/2002 4/18/2006    exstensive diverticulosis coli  Dr Adelia Nicolas   • COLONOSCOPY  6/23/15    Dr.David Jeanne Rhoades rpt in 3 years    • [facility] masses.  No CVA tenderness  EXTREMITIES: No peripheral edema, no calf tenderness    ED Course     Labs Reviewed   COMP METABOLIC PANEL (14) - Abnormal; Notable for the following components:       Result Value    BUN/CREA Ratio 22.0 (*)     All other compone symptoms and was discharged good condition with wife. Patient was screened and evaluated during this visit.   As the treating physician attending to the patient, I determined within reasonable clinical confidence and prior to discharge, that an emergency m

## 2018-10-13 NOTE — ED INITIAL ASSESSMENT (HPI)
C/O about 30 min PTA felt a \"sharp stabbing pain to chest\" non-radiating / Bertha Grooms states \"he became pale\"

## 2018-10-19 ENCOUNTER — TELEPHONE (OUTPATIENT)
Dept: SURGERY | Facility: CLINIC | Age: 68
End: 2018-10-19

## 2018-10-19 RX ORDER — POLYETHYLENE GLYCOL 3350, SODIUM CHLORIDE, SODIUM BICARBONATE, POTASSIUM CHLORIDE 420; 11.2; 5.72; 1.48 G/4L; G/4L; G/4L; G/4L
POWDER, FOR SOLUTION ORAL
Qty: 1 BOTTLE | Refills: 0 | Status: SHIPPED | OUTPATIENT
Start: 2018-10-19 | End: 2019-06-03

## 2018-11-05 ENCOUNTER — TELEPHONE (OUTPATIENT)
Dept: SURGERY | Facility: CLINIC | Age: 68
End: 2018-11-05

## 2018-11-06 NOTE — TELEPHONE ENCOUNTER
Pt calling states has colonoscopy tomorrow with DJSAVANNAH and is having a hard time drinking the trilyte. States drank about half of it but is having trouble drinking the remaining. Is wondering if can do the dulcolax and magnesium prep.   Spoke with Monse SAM

## 2018-12-12 RX ORDER — PRAVASTATIN SODIUM 20 MG
TABLET ORAL
Qty: 90 TABLET | Refills: 0 | Status: SHIPPED | OUTPATIENT
Start: 2018-12-12 | End: 2019-05-29

## 2019-04-03 ENCOUNTER — TELEPHONE (OUTPATIENT)
Dept: INTERNAL MEDICINE CLINIC | Facility: CLINIC | Age: 69
End: 2019-04-03

## 2019-04-15 RX ORDER — LOSARTAN POTASSIUM 25 MG/1
25 TABLET ORAL
Qty: 90 TABLET | Refills: 0 | Status: SHIPPED | OUTPATIENT
Start: 2019-04-15 | End: 2019-06-03

## 2019-04-15 NOTE — TELEPHONE ENCOUNTER
LOV-5/30/18 JL  FOV-none  LAST RX-10/10/18 90 tabs 0 refills  LAST LABS-10/13/18  PER PROTOCOL-to provider

## 2019-04-15 NOTE — TELEPHONE ENCOUNTER
Pt called stating [facility] has new pharmacy [facility]-please send in new rx for LOSARTAN POTASSIUM 25 MG Oral Tab #90 w/3 refills

## 2019-04-18 ENCOUNTER — TELEPHONE (OUTPATIENT)
Dept: INTERNAL MEDICINE CLINIC | Facility: CLINIC | Age: 69
End: 2019-04-18

## 2019-04-18 DIAGNOSIS — Z12.5 SCREENING FOR MALIGNANT NEOPLASM OF PROSTATE: ICD-10-CM

## 2019-04-18 DIAGNOSIS — I10 ESSENTIAL HYPERTENSION, BENIGN: Primary | ICD-10-CM

## 2019-04-18 DIAGNOSIS — F41.9 ANXIETY: ICD-10-CM

## 2019-04-18 DIAGNOSIS — E78.00 PURE HYPERCHOLESTEROLEMIA: ICD-10-CM

## 2019-04-18 NOTE — TELEPHONE ENCOUNTER
Future Appointments   Date Time Provider Calixto Damiani   6/3/2019  3:30 PM Rob Magdaleno MD [facility]     Orders to edward- Pt aware to fast-no call back required

## 2019-04-19 NOTE — TELEPHONE ENCOUNTER
Future Appointments   Date Time Provider Calixto Lerma   6/3/2019  3:30 PM Surinder Avelar MD [facility]

## 2019-05-29 RX ORDER — PRAVASTATIN SODIUM 20 MG
TABLET ORAL
Qty: 90 TABLET | Refills: 0 | Status: SHIPPED | OUTPATIENT
Start: 2019-05-29 | End: 2019-06-03

## 2019-05-29 NOTE — TELEPHONE ENCOUNTER
Pt called and needs his PRAVASTATIN SODIUM 20 MG Oral Tab sent to his [facility] pharmacy [facility] [address] [facility] [address], [phone], [phone]

## 2019-05-29 NOTE — TELEPHONE ENCOUNTER
Did not pass protocol- pt due for LIPID   Labs have been ordered for upcoming CPE on 6/3/19  Per protocol routed to provider

## 2019-06-03 ENCOUNTER — OFFICE VISIT (OUTPATIENT)
Dept: INTERNAL MEDICINE CLINIC | Facility: CLINIC | Age: 69
End: 2019-06-03
Payer: MEDICARE

## 2019-06-03 ENCOUNTER — LAB ENCOUNTER (OUTPATIENT)
Dept: LAB | Age: 69
End: 2019-06-03
Attending: INTERNAL MEDICINE
Payer: MEDICARE

## 2019-06-03 VITALS
BODY MASS INDEX: 31.22 KG/M2 | HEIGHT: 68 IN | TEMPERATURE: 98 F | SYSTOLIC BLOOD PRESSURE: 116 MMHG | HEART RATE: 84 BPM | RESPIRATION RATE: 16 BRPM | WEIGHT: 206 LBS | DIASTOLIC BLOOD PRESSURE: 76 MMHG

## 2019-06-03 DIAGNOSIS — F41.9 ANXIETY: ICD-10-CM

## 2019-06-03 DIAGNOSIS — E78.00 PURE HYPERCHOLESTEROLEMIA: ICD-10-CM

## 2019-06-03 DIAGNOSIS — M41.26 IDIOPATHIC SCOLIOSIS OF LUMBAR REGION: ICD-10-CM

## 2019-06-03 DIAGNOSIS — M48.061 LUMBAR FORAMINAL STENOSIS: ICD-10-CM

## 2019-06-03 DIAGNOSIS — F32.9 REACTIVE DEPRESSION: ICD-10-CM

## 2019-06-03 DIAGNOSIS — I10 ESSENTIAL HYPERTENSION, BENIGN: ICD-10-CM

## 2019-06-03 DIAGNOSIS — Z12.5 SCREENING FOR MALIGNANT NEOPLASM OF PROSTATE: ICD-10-CM

## 2019-06-03 DIAGNOSIS — Z98.1 S/P LUMBAR FUSION: ICD-10-CM

## 2019-06-03 DIAGNOSIS — I10 ESSENTIAL HYPERTENSION, BENIGN: Primary | ICD-10-CM

## 2019-06-03 DIAGNOSIS — M43.16 SPONDYLOLISTHESIS OF LUMBAR REGION: ICD-10-CM

## 2019-06-03 DIAGNOSIS — Z00.00 ROUTINE GENERAL MEDICAL EXAMINATION AT A HEALTH CARE FACILITY: ICD-10-CM

## 2019-06-03 DIAGNOSIS — K57.90 DIVERTICULOSIS: ICD-10-CM

## 2019-06-03 DIAGNOSIS — Z85.038 HISTORY OF COLON CANCER: ICD-10-CM

## 2019-06-03 PROCEDURE — 80053 COMPREHEN METABOLIC PANEL: CPT

## 2019-06-03 PROCEDURE — 99397 PER PM REEVAL EST PAT 65+ YR: CPT | Performed by: INTERNAL MEDICINE

## 2019-06-03 PROCEDURE — G0439 PPPS, SUBSEQ VISIT: HCPCS | Performed by: INTERNAL MEDICINE

## 2019-06-03 PROCEDURE — 96160 PT-FOCUSED HLTH RISK ASSMT: CPT | Performed by: INTERNAL MEDICINE

## 2019-06-03 PROCEDURE — 36415 COLL VENOUS BLD VENIPUNCTURE: CPT

## 2019-06-03 PROCEDURE — 85025 COMPLETE CBC W/AUTO DIFF WBC: CPT

## 2019-06-03 PROCEDURE — 80061 LIPID PANEL: CPT

## 2019-06-03 RX ORDER — ESCITALOPRAM OXALATE 10 MG/1
TABLET ORAL
Qty: 90 TABLET | Refills: 3 | Status: SHIPPED | OUTPATIENT
Start: 2019-06-03 | End: 2020-07-10

## 2019-06-03 RX ORDER — LOSARTAN POTASSIUM 25 MG/1
25 TABLET ORAL
Qty: 90 TABLET | Refills: 3 | Status: SHIPPED | OUTPATIENT
Start: 2019-06-03 | End: 2020-06-02

## 2019-06-03 RX ORDER — PRAVASTATIN SODIUM 20 MG
TABLET ORAL
Qty: 90 TABLET | Refills: 3 | Status: SHIPPED | OUTPATIENT
Start: 2019-06-03 | End: 2020-07-06

## 2019-06-06 NOTE — PROGRESS NOTES
HPI:    Patient ID: Noe Kumar is a 81year old male.     HPI  Here for map, feels well, no acute complaints  /76 (BP Location: Right arm, Patient Position: Sitting, Cuff Size: adult)   Pulse 84   Temp 97.8 °F (36.6 °C) (Oral)   Resp 16   Ht 68\ place, and time. No cranial nerve deficit. Skin: Skin is warm. No rash noted. He is not diaphoretic. Psychiatric: He has a normal mood and affect.               ASSESSMENT/PLAN:   Routine general medical examination at a health care facility-see awv for

## 2019-08-12 ENCOUNTER — MED REC SCAN ONLY (OUTPATIENT)
Dept: INTERNAL MEDICINE CLINIC | Facility: CLINIC | Age: 69
End: 2019-08-12

## 2019-08-16 ENCOUNTER — TELEPHONE (OUTPATIENT)
Dept: INTERNAL MEDICINE CLINIC | Facility: CLINIC | Age: 69
End: 2019-08-16

## 2019-08-16 NOTE — TELEPHONE ENCOUNTER
Called pt to discuss. He saw Dr. Costello Eric on Friday then again on Monday for injection (acute issue). Pt is wondering if eye doctor and JL have spoken about pt's eye issue.   Pt was recommended to f/u with JL again to check for possible systemic iss

## 2019-08-16 NOTE — TELEPHONE ENCOUNTER
Pt called and would like a call back to discuss the recommendations he received from his ophthalmology doctor for the issue he is having with the retina in his Right eye     Please advise

## 2019-08-19 NOTE — TELEPHONE ENCOUNTER
Lm for pt ([address] [address] per HIPAA) to inform, per JL, will need 30 min apt to further discuss results. Spoke with Dr. Jairo Gutierrez office today to confirm will re-fax recent OV notes to our office @[phone].  To call back at the office to schedule or if any further q

## 2019-08-19 NOTE — TELEPHONE ENCOUNTER
Future Appointments   Date Time Provider Calixto Maria Guadalupe   8/23/2019  9:00 AM Krys Ramos MD [facility]

## 2019-08-23 ENCOUNTER — OFFICE VISIT (OUTPATIENT)
Dept: INTERNAL MEDICINE CLINIC | Facility: CLINIC | Age: 69
End: 2019-08-23
Payer: MEDICARE

## 2019-08-23 VITALS
RESPIRATION RATE: 16 BRPM | BODY MASS INDEX: 31.58 KG/M2 | DIASTOLIC BLOOD PRESSURE: 86 MMHG | HEART RATE: 80 BPM | WEIGHT: 208.38 LBS | SYSTOLIC BLOOD PRESSURE: 134 MMHG | HEIGHT: 68 IN

## 2019-08-23 DIAGNOSIS — H35.9 RETINA DISORDER: Primary | ICD-10-CM

## 2019-08-23 PROCEDURE — 99213 OFFICE O/P EST LOW 20 MIN: CPT | Performed by: INTERNAL MEDICINE

## 2019-08-23 NOTE — PROGRESS NOTES
HPI:    Patient ID: Bertha Gordon is a 81year old male. HPI  Here for questions about abnormal retinal exam and possible assoc with pagets, I did discuss with Dr. Iggy Burrows a few weeks ago.   There is some possible assoc with conective tissue dx and Page INTERNAL       SS#[id number]

## 2019-10-08 ENCOUNTER — MED REC SCAN ONLY (OUTPATIENT)
Dept: INTERNAL MEDICINE CLINIC | Facility: CLINIC | Age: 69
End: 2019-10-08

## 2019-11-11 ENCOUNTER — MED REC SCAN ONLY (OUTPATIENT)
Dept: INTERNAL MEDICINE CLINIC | Facility: CLINIC | Age: 69
End: 2019-11-11

## 2019-12-03 ENCOUNTER — MED REC SCAN ONLY (OUTPATIENT)
Dept: INTERNAL MEDICINE CLINIC | Facility: CLINIC | Age: 69
End: 2019-12-03

## 2019-12-30 ENCOUNTER — OFFICE VISIT (OUTPATIENT)
Dept: FAMILY MEDICINE CLINIC | Facility: CLINIC | Age: 69
End: 2019-12-30
Payer: MEDICARE

## 2019-12-30 VITALS
WEIGHT: 213.38 LBS | SYSTOLIC BLOOD PRESSURE: 130 MMHG | DIASTOLIC BLOOD PRESSURE: 80 MMHG | RESPIRATION RATE: 16 BRPM | OXYGEN SATURATION: 99 % | HEART RATE: 78 BPM | BODY MASS INDEX: 32.34 KG/M2 | TEMPERATURE: 100 F | HEIGHT: 68 IN

## 2019-12-30 DIAGNOSIS — J06.9 ACUTE URI: Primary | ICD-10-CM

## 2019-12-30 PROCEDURE — 99213 OFFICE O/P EST LOW 20 MIN: CPT | Performed by: NURSE PRACTITIONER

## 2019-12-30 RX ORDER — AZITHROMYCIN 250 MG/1
TABLET, FILM COATED ORAL
Qty: 6 TABLET | Refills: 0 | Status: SHIPPED | OUTPATIENT
Start: 2019-12-30 | End: 2020-01-16 | Stop reason: ALTCHOICE

## 2019-12-30 RX ORDER — BENZONATATE 200 MG/1
200 CAPSULE ORAL 3 TIMES DAILY PRN
Qty: 30 CAPSULE | Refills: 0 | Status: SHIPPED | OUTPATIENT
Start: 2019-12-30 | End: 2020-01-16 | Stop reason: ALTCHOICE

## 2019-12-30 NOTE — PROGRESS NOTES
Patient presents with:  Cough: 5 days, taking Muciex with little relief. Denies fevers. HX of Bronchitis. HPI:   Ady Jonas is a 81year old male who presents for upper respiratory symptoms for  5  days.  Patient reports low grade fever, cough w Spondylolisthesis of lumbar region 5/31/2016   • Squamous cell carcinoma    • Unspecified essential hypertension       Past Surgical History:   Procedure Laterality Date   • ANTERIOR SPINAL FUSION 1 LEVEL N/A 5/31/2016    Performed by Lorin Adame MD at [facility]  HEENT: atraumatic, normocephalic,TM wnl sagar, no erythema of the throat.   NECK: supple,no adenopathy  LUNGS: clear to auscultation  CARDIO: RRR without murmur  GI: good BS's,no masses, HSM or tenderness    ASSESSMENT AND PLAN:   Alisha Quinn is a

## 2020-01-16 ENCOUNTER — OFFICE VISIT (OUTPATIENT)
Dept: FAMILY MEDICINE CLINIC | Facility: CLINIC | Age: 70
End: 2020-01-16
Payer: MEDICARE

## 2020-01-16 VITALS
TEMPERATURE: 98 F | SYSTOLIC BLOOD PRESSURE: 124 MMHG | OXYGEN SATURATION: 97 % | BODY MASS INDEX: 31.55 KG/M2 | HEART RATE: 89 BPM | DIASTOLIC BLOOD PRESSURE: 80 MMHG | HEIGHT: 69 IN | WEIGHT: 213 LBS

## 2020-01-16 DIAGNOSIS — H69.81 DYSFUNCTION OF RIGHT EUSTACHIAN TUBE: Primary | ICD-10-CM

## 2020-01-16 DIAGNOSIS — H92.01 OTALGIA, RIGHT: ICD-10-CM

## 2020-01-16 PROCEDURE — 99213 OFFICE O/P EST LOW 20 MIN: CPT | Performed by: NURSE PRACTITIONER

## 2020-01-16 RX ORDER — FLUTICASONE PROPIONATE 50 MCG
1 SPRAY, SUSPENSION (ML) NASAL 2 TIMES DAILY
Qty: 1 BOTTLE | Refills: 1 | Status: SHIPPED | OUTPATIENT
Start: 2020-01-16 | End: 2020-02-15

## 2020-01-16 NOTE — PROGRESS NOTES
HPI:   Patrick Tyler is a 81year old male who presents with ill symptoms for  1  days. Patient reports right ear pain with chewing. Has tried nothing for relief. Recent URI treated with Zithromax and Benzonatate in the last month.  Denies hearing loss, exstensive diverticulosis coli  Dr Evi Kerns   • COLONOSCOPY  6/23/15    Yolanda Cole rpt in 3 years    • COLONOSCOPY  11/06/2018    Dr. Evi Kerns repeat in 3 yrs   • INTRAOPERATIVE NEURO MONITORING N/A 5/31/2016    Performed by Ada Garcia MD Dysfunction of right eustachian tube  -     Fluticasone Propionate 50 MCG/ACT Nasal Suspension; 1 spray by Each Nare route 2 (two) times daily.     Otalgia, right  -     Fluticasone Propionate 50 MCG/ACT Nasal Suspension; 1 spray by Each Nare route 2 (two) Because the middle ear fluid can become infected, it is important to watch for signs of an ear infection which may develop later. These signs include increased ear pain, fever, or drainage from the ear.   Home care  The following guidelines will help you ca

## 2020-01-22 ENCOUNTER — MED REC SCAN ONLY (OUTPATIENT)
Dept: INTERNAL MEDICINE CLINIC | Facility: CLINIC | Age: 70
End: 2020-01-22

## 2020-03-09 ENCOUNTER — MED REC SCAN ONLY (OUTPATIENT)
Dept: INTERNAL MEDICINE CLINIC | Facility: CLINIC | Age: 70
End: 2020-03-09

## 2020-03-16 ENCOUNTER — TELEPHONE (OUTPATIENT)
Dept: INTERNAL MEDICINE CLINIC | Facility: CLINIC | Age: 70
End: 2020-03-16

## 2020-03-16 NOTE — TELEPHONE ENCOUNTER
Received eye exam consult note from [address] [place] [address] 3/13/20. Yasmin Rivers M.D. Document placed on provider JL desk for review.

## 2020-04-06 ENCOUNTER — TELEPHONE (OUTPATIENT)
Dept: INTERNAL MEDICINE CLINIC | Facility: CLINIC | Age: 70
End: 2020-04-06

## 2020-04-06 NOTE — TELEPHONE ENCOUNTER
Received fax from [employer] for clinic procedure dated 4. 3.20. Placed in [address] [place] [address] for review.

## 2020-04-23 ENCOUNTER — MED REC SCAN ONLY (OUTPATIENT)
Dept: INTERNAL MEDICINE CLINIC | Facility: CLINIC | Age: 70
End: 2020-04-23

## 2020-05-04 ENCOUNTER — MED REC SCAN ONLY (OUTPATIENT)
Dept: INTERNAL MEDICINE CLINIC | Facility: CLINIC | Age: 70
End: 2020-05-04

## 2020-05-04 NOTE — PROGRESS NOTES
Received fax from [facility] for injection f/u. Placed in  [facility] for [facility].

## 2020-06-02 NOTE — TELEPHONE ENCOUNTER
Last OV 8.23.19 w/ JL (f/up)  Last PE 6.3.19-due   Last REFILL 6.3.19 Losartan 25mg #90 3R  Last LABS 6.3.19 CBC, CMP, Lipid, PSA screen     Future Appointments   Date Time Provider Calixto Lerma   6/17/2020 10:15 AM Aida Lam MD [facility]

## 2020-06-03 RX ORDER — LOSARTAN POTASSIUM 25 MG/1
TABLET ORAL
Qty: 90 TABLET | Refills: 0 | Status: SHIPPED | OUTPATIENT
Start: 2020-06-03 | End: 2020-07-15

## 2020-06-15 ENCOUNTER — TELEPHONE (OUTPATIENT)
Dept: INTERNAL MEDICINE CLINIC | Facility: CLINIC | Age: 70
End: 2020-06-15

## 2020-06-15 DIAGNOSIS — Z00.00 ROUTINE GENERAL MEDICAL EXAMINATION AT A HEALTH CARE FACILITY: Primary | ICD-10-CM

## 2020-06-15 DIAGNOSIS — Z12.5 SCREENING FOR MALIGNANT NEOPLASM OF PROSTATE: ICD-10-CM

## 2020-06-15 DIAGNOSIS — E78.00 PURE HYPERCHOLESTEROLEMIA: ICD-10-CM

## 2020-06-15 DIAGNOSIS — I10 ESSENTIAL HYPERTENSION, BENIGN: ICD-10-CM

## 2020-06-15 NOTE — TELEPHONE ENCOUNTER
Future Appointments   Date Time Provider Calixto Maria Guadalupe   8/31/2020 10:30 Guero Navarrete MD [facility]     Future [address]      [facility] [place]      [facility]     Orders to quest-Pt informed th

## 2020-07-06 RX ORDER — PRAVASTATIN SODIUM 20 MG
TABLET ORAL
Qty: 90 TABLET | Refills: 1 | Status: SHIPPED | OUTPATIENT
Start: 2020-07-06 | End: 2021-01-07

## 2020-07-06 NOTE — TELEPHONE ENCOUNTER
Last VISIT 08/23/19    Last REFILL 06/03/19 qty 90 w/3 refills    Last LABS No labs in past year. Future Appointments   Date Time Provider Calixto Lerma          8/31/2020 10:30 AM Cammy Jolly MD [facility]         Per PROTOCOL?  Nilda Jack

## 2020-07-10 RX ORDER — ESCITALOPRAM OXALATE 10 MG/1
TABLET ORAL
Qty: 90 TABLET | Refills: 1 | Status: SHIPPED | OUTPATIENT
Start: 2020-07-10 | End: 2020-08-31

## 2020-07-10 NOTE — TELEPHONE ENCOUNTER
Last Ov:8/23/19  Upcoming appt:8/31/20  Last labs:6/3/19 PSA, lipid, cmp, cbc  Last Rx:6/3/19 escitalopram 10mg    Per Protocol sent for review

## 2020-07-13 RX ORDER — LOSARTAN POTASSIUM 50 MG/1
25 TABLET ORAL DAILY
Qty: 45 TABLET | Refills: 0 | Status: SHIPPED | OUTPATIENT
Start: 2020-07-13 | End: 2020-07-15

## 2020-07-13 NOTE — TELEPHONE ENCOUNTER
Received paper from [employer], Losartan Potassium 25 mg on backorder, requesting alternative. Please advise.

## 2020-07-14 ENCOUNTER — TELEPHONE (OUTPATIENT)
Dept: INTERNAL MEDICINE CLINIC | Facility: CLINIC | Age: 70
End: 2020-07-14

## 2020-07-15 RX ORDER — VALSARTAN 80 MG/1
80 TABLET ORAL DAILY
Qty: 90 TABLET | Refills: 0 | Status: SHIPPED | OUTPATIENT
Start: 2020-07-15 | End: 2020-10-09

## 2020-07-15 NOTE — TELEPHONE ENCOUNTER
Spoke with Julius Butler, notified of Rx change and to schedule F/U with JL   Pt expressed understanding.

## 2020-07-20 ENCOUNTER — TELEPHONE (OUTPATIENT)
Dept: INTERNAL MEDICINE CLINIC | Facility: CLINIC | Age: 70
End: 2020-07-20

## 2020-08-03 ENCOUNTER — TELEPHONE (OUTPATIENT)
Dept: INTERNAL MEDICINE CLINIC | Facility: CLINIC | Age: 70
End: 2020-08-03

## 2020-08-03 NOTE — TELEPHONE ENCOUNTER
Patient states since 7/13/2020 has been taking 50 mg tablets. Current medication fill of losartan was 50 mg tablets compared to previous 25 mg tablets. Patient was not aware of the change. Patient denies any new s/s.  Patient states tomorrow AM will be meg

## 2020-08-03 NOTE — TELEPHONE ENCOUNTER
valsartan 80 MG Oral Tab, Pt would like to discuss the dosage change from Losartan     Pls call to discuss,

## 2020-08-03 NOTE — TELEPHONE ENCOUNTER
Bloodwork orders placed to THE [facility]  lab for upcoming physical per protocol.

## 2020-08-04 ENCOUNTER — MED REC SCAN ONLY (OUTPATIENT)
Dept: INTERNAL MEDICINE CLINIC | Facility: CLINIC | Age: 70
End: 2020-08-04

## 2020-08-04 NOTE — TELEPHONE ENCOUNTER
Patent currently taking losartan 25 mg today (last two weeks he misread bottle and was taking one 50 mg tablet daily instead of 25mg) until his supply is gone, patient will then start valsartan 80 mg.  Patient was just confirming dosing of valsartan with JL

## 2020-08-18 NOTE — TELEPHONE ENCOUNTER
Yes, please make a 30 min apt, make sure Dr. Meredith Hoover note is scanned into the system and available BEFORE the apt LOCKER # 4

## 2020-08-27 ENCOUNTER — LAB ENCOUNTER (OUTPATIENT)
Dept: LAB | Age: 70
End: 2020-08-27
Attending: INTERNAL MEDICINE
Payer: MEDICARE

## 2020-08-27 DIAGNOSIS — Z12.5 SCREENING FOR MALIGNANT NEOPLASM OF PROSTATE: ICD-10-CM

## 2020-08-27 DIAGNOSIS — Z00.00 ROUTINE GENERAL MEDICAL EXAMINATION AT A HEALTH CARE FACILITY: ICD-10-CM

## 2020-08-27 DIAGNOSIS — E78.00 PURE HYPERCHOLESTEROLEMIA: ICD-10-CM

## 2020-08-27 DIAGNOSIS — I10 ESSENTIAL HYPERTENSION, BENIGN: ICD-10-CM

## 2020-08-27 LAB
ALBUMIN SERPL-MCNC: 4.2 G/DL (ref 3.4–5)
ALBUMIN/GLOB SERPL: 1.4 {RATIO} (ref 1–2)
ALP LIVER SERPL-CCNC: 58 U/L (ref 45–117)
ALT SERPL-CCNC: 29 U/L (ref 16–61)
ANION GAP SERPL CALC-SCNC: 3 MMOL/L (ref 0–18)
AST SERPL-CCNC: 17 U/L (ref 15–37)
BASOPHILS # BLD AUTO: 0.04 X10(3) UL (ref 0–0.2)
BASOPHILS NFR BLD AUTO: 0.7 %
BILIRUB SERPL-MCNC: 0.6 MG/DL (ref 0.1–2)
BUN BLD-MCNC: 16 MG/DL (ref 7–18)
BUN/CREAT SERPL: 16.3 (ref 10–20)
CALCIUM BLD-MCNC: 8.9 MG/DL (ref 8.5–10.1)
CHLORIDE SERPL-SCNC: 104 MMOL/L (ref 98–112)
CHOLEST SMN-MCNC: 194 MG/DL (ref ?–200)
CO2 SERPL-SCNC: 30 MMOL/L (ref 21–32)
COMPLEXED PSA SERPL-MCNC: 1.06 NG/ML (ref ?–4)
CREAT BLD-MCNC: 0.98 MG/DL (ref 0.7–1.3)
DEPRECATED RDW RBC AUTO: 43.8 FL (ref 35.1–46.3)
EOSINOPHIL # BLD AUTO: 0.15 X10(3) UL (ref 0–0.7)
EOSINOPHIL NFR BLD AUTO: 2.7 %
ERYTHROCYTE [DISTWIDTH] IN BLOOD BY AUTOMATED COUNT: 12.2 % (ref 11–15)
GLOBULIN PLAS-MCNC: 3 G/DL (ref 2.8–4.4)
GLUCOSE BLD-MCNC: 103 MG/DL (ref 70–99)
HCT VFR BLD AUTO: 45.3 % (ref 39–53)
HDLC SERPL-MCNC: 61 MG/DL (ref 40–59)
HGB BLD-MCNC: 15.2 G/DL (ref 13–17.5)
IMM GRANULOCYTES # BLD AUTO: 0.02 X10(3) UL (ref 0–1)
IMM GRANULOCYTES NFR BLD: 0.4 %
LDLC SERPL CALC-MCNC: 117 MG/DL (ref ?–100)
LYMPHOCYTES # BLD AUTO: 1.91 X10(3) UL (ref 1–4)
LYMPHOCYTES NFR BLD AUTO: 34.5 %
M PROTEIN MFR SERPL ELPH: 7.2 G/DL (ref 6.4–8.2)
MCH RBC QN AUTO: 33 PG (ref 26–34)
MCHC RBC AUTO-ENTMCNC: 33.6 G/DL (ref 31–37)
MCV RBC AUTO: 98.5 FL (ref 80–100)
MONOCYTES # BLD AUTO: 0.5 X10(3) UL (ref 0.1–1)
MONOCYTES NFR BLD AUTO: 9 %
NEUTROPHILS # BLD AUTO: 2.91 X10 (3) UL (ref 1.5–7.7)
NEUTROPHILS # BLD AUTO: 2.91 X10(3) UL (ref 1.5–7.7)
NEUTROPHILS NFR BLD AUTO: 52.7 %
NONHDLC SERPL-MCNC: 133 MG/DL (ref ?–130)
OSMOLALITY SERPL CALC.SUM OF ELEC: 285 MOSM/KG (ref 275–295)
PATIENT FASTING Y/N/NP: YES
PATIENT FASTING Y/N/NP: YES
PLATELET # BLD AUTO: 239 10(3)UL (ref 150–450)
POTASSIUM SERPL-SCNC: 4 MMOL/L (ref 3.5–5.1)
RBC # BLD AUTO: 4.6 X10(6)UL (ref 3.8–5.8)
SODIUM SERPL-SCNC: 137 MMOL/L (ref 136–145)
TRIGL SERPL-MCNC: 81 MG/DL (ref 30–149)
VLDLC SERPL CALC-MCNC: 16 MG/DL (ref 0–30)
WBC # BLD AUTO: 5.5 X10(3) UL (ref 4–11)

## 2020-08-27 PROCEDURE — 36415 COLL VENOUS BLD VENIPUNCTURE: CPT

## 2020-08-27 PROCEDURE — 85025 COMPLETE CBC W/AUTO DIFF WBC: CPT

## 2020-08-27 PROCEDURE — 80061 LIPID PANEL: CPT

## 2020-08-27 PROCEDURE — 80053 COMPREHEN METABOLIC PANEL: CPT

## 2020-08-31 ENCOUNTER — OFFICE VISIT (OUTPATIENT)
Dept: INTERNAL MEDICINE CLINIC | Facility: CLINIC | Age: 70
End: 2020-08-31
Payer: MEDICARE

## 2020-08-31 VITALS
WEIGHT: 213 LBS | TEMPERATURE: 97 F | HEIGHT: 67.72 IN | BODY MASS INDEX: 32.66 KG/M2 | RESPIRATION RATE: 16 BRPM | HEART RATE: 72 BPM | SYSTOLIC BLOOD PRESSURE: 128 MMHG | DIASTOLIC BLOOD PRESSURE: 76 MMHG

## 2020-08-31 DIAGNOSIS — F32.9 REACTIVE DEPRESSION: ICD-10-CM

## 2020-08-31 DIAGNOSIS — K57.90 DIVERTICULOSIS: ICD-10-CM

## 2020-08-31 DIAGNOSIS — Z13.6 SCREENING FOR AAA (ABDOMINAL AORTIC ANEURYSM): ICD-10-CM

## 2020-08-31 DIAGNOSIS — F41.9 ANXIETY: ICD-10-CM

## 2020-08-31 DIAGNOSIS — I10 ESSENTIAL HYPERTENSION, BENIGN: ICD-10-CM

## 2020-08-31 DIAGNOSIS — Z00.00 ROUTINE GENERAL MEDICAL EXAMINATION AT A HEALTH CARE FACILITY: ICD-10-CM

## 2020-08-31 DIAGNOSIS — Z85.038 HISTORY OF COLON CANCER: ICD-10-CM

## 2020-08-31 DIAGNOSIS — Z98.1 S/P LUMBAR FUSION: ICD-10-CM

## 2020-08-31 DIAGNOSIS — M48.061 LUMBAR FORAMINAL STENOSIS: ICD-10-CM

## 2020-08-31 DIAGNOSIS — M41.26 IDIOPATHIC SCOLIOSIS OF LUMBAR REGION: ICD-10-CM

## 2020-08-31 DIAGNOSIS — H35.3121 EARLY DRY STAGE NONEXUDATIVE AGE-RELATED MACULAR DEGENERATION OF LEFT EYE: ICD-10-CM

## 2020-08-31 DIAGNOSIS — E78.00 PURE HYPERCHOLESTEROLEMIA: Primary | ICD-10-CM

## 2020-08-31 PROCEDURE — 3008F BODY MASS INDEX DOCD: CPT | Performed by: INTERNAL MEDICINE

## 2020-08-31 PROCEDURE — 96160 PT-FOCUSED HLTH RISK ASSMT: CPT | Performed by: INTERNAL MEDICINE

## 2020-08-31 PROCEDURE — 99397 PER PM REEVAL EST PAT 65+ YR: CPT | Performed by: INTERNAL MEDICINE

## 2020-08-31 PROCEDURE — G0439 PPPS, SUBSEQ VISIT: HCPCS | Performed by: INTERNAL MEDICINE

## 2020-08-31 PROCEDURE — 3074F SYST BP LT 130 MM HG: CPT | Performed by: INTERNAL MEDICINE

## 2020-08-31 PROCEDURE — 3078F DIAST BP <80 MM HG: CPT | Performed by: INTERNAL MEDICINE

## 2020-08-31 RX ORDER — ESCITALOPRAM OXALATE 10 MG/1
5 TABLET ORAL DAILY
Qty: 90 TABLET | Refills: 1 | COMMUNITY
Start: 2020-08-31 | End: 2021-02-03

## 2020-08-31 NOTE — PROGRESS NOTES
HPI:    Patient ID: Arslan Wakefield is a 78year old male.     HPI  Here for aha, see awv form, anxiety controlled, pt is interested in decreasing meds, htn, hyperchol, macular degen follows reg with optho  /76 (BP Location: Right arm, Patient Positi penile tenderness. Musculoskeletal: Normal range of motion. General: No edema. Neurological: He is oriented to person, place, and time. Skin: Skin is warm. He is not diaphoretic. Psychiatric: He has a normal mood and affect.               AS

## 2020-09-29 NOTE — TELEPHONE ENCOUNTER
escitalopram 10 MG Oral Tab,     pt calling back to ask JL to prescribe 5mg. Pt switched to taking half a pill  of 10mg dose. Pt stated  he is doing OK taking that amount and if he was doing OK, JL would switch the dose.      Pls make sure new script goe

## 2020-09-29 NOTE — TELEPHONE ENCOUNTER
Last Ov:8/31/20  Upcoming appt:none  Last labs:8/27/20 psa, lipid, cmp, cbc  Last Rx:8/31/20 escitalopram 10mg (patient slowly lowering dose per JL) 5mg escitalopram pended    Per Protocol sent for review

## 2020-09-30 RX ORDER — ESCITALOPRAM OXALATE 5 MG/1
5 TABLET ORAL DAILY
Qty: 90 TABLET | Refills: 1 | Status: SHIPPED | OUTPATIENT
Start: 2020-09-30 | End: 2021-01-28

## 2020-10-01 ENCOUNTER — HOSPITAL ENCOUNTER (OUTPATIENT)
Dept: ULTRASOUND IMAGING | Age: 70
Discharge: HOME | End: 2020-10-01
Attending: INTERNAL MEDICINE
Payer: MEDICARE

## 2020-10-01 ENCOUNTER — MED REC SCAN ONLY (OUTPATIENT)
Dept: INTERNAL MEDICINE CLINIC | Facility: CLINIC | Age: 70
End: 2020-10-01

## 2020-10-01 DIAGNOSIS — Z13.6 SCREENING FOR AAA (ABDOMINAL AORTIC ANEURYSM): ICD-10-CM

## 2020-10-01 PROCEDURE — 76706 US ABDL AORTA SCREEN AAA: CPT | Performed by: INTERNAL MEDICINE

## 2020-10-01 NOTE — PROGRESS NOTES
Received Eye Exam (Nondiabetic)  from [facility] DOS 9/28/2020 Dr Elisabet Mina. Placed on provider JL desk for review.

## 2020-10-09 RX ORDER — VALSARTAN 80 MG/1
TABLET ORAL
Qty: 90 TABLET | Refills: 0 | Status: SHIPPED | OUTPATIENT
Start: 2020-10-09 | End: 2021-04-23

## 2020-10-23 ENCOUNTER — MED REC SCAN ONLY (OUTPATIENT)
Dept: INTERNAL MEDICINE CLINIC | Facility: CLINIC | Age: 70
End: 2020-10-23

## 2020-10-23 NOTE — PROGRESS NOTES
Placed eye consult note on provider JL desk for review.     [facility] Pacer Retina  DOS 10/16/20  Katrin Centeno MD    &    [facility] Pacematt Retina   DOS 10/26/20  Katrin Centeno MD

## 2020-11-02 ENCOUNTER — TELEPHONE (OUTPATIENT)
Dept: INTERNAL MEDICINE CLINIC | Facility: CLINIC | Age: 70
End: 2020-11-02

## 2020-11-02 NOTE — TELEPHONE ENCOUNTER
Submitted PA to [facility] for Escitalopram 10mg. Faxed to [phone]. Confirmation received. Awaiting approval/denial. Will update patient when determination received.

## 2020-12-24 ENCOUNTER — MED REC SCAN ONLY (OUTPATIENT)
Dept: INTERNAL MEDICINE CLINIC | Facility: CLINIC | Age: 70
End: 2020-12-24

## 2021-01-07 RX ORDER — PRAVASTATIN SODIUM 20 MG
TABLET ORAL
Qty: 90 TABLET | Refills: 1 | Status: SHIPPED | OUTPATIENT
Start: 2021-01-07 | End: 2021-07-09

## 2021-01-07 NOTE — TELEPHONE ENCOUNTER
Last visit-  08/31/2020    Last refill-  07/06/2020 pravastatin sodium 20mg QTY90 1R    Last labs-  08/27/2020 psa screen, lipid, cmp, cbc    No future appointments.     Per Protocol- Passed

## 2021-01-21 ENCOUNTER — MED REC SCAN ONLY (OUTPATIENT)
Dept: INTERNAL MEDICINE CLINIC | Facility: CLINIC | Age: 71
End: 2021-01-21

## 2021-01-21 NOTE — PROGRESS NOTES
Received Progress note from [address] [place] [address] 1/20/21. Maria Luisa Gutierrez MD. Placed on provider JL desk for review.

## 2021-01-23 ENCOUNTER — PATIENT MESSAGE (OUTPATIENT)
Dept: INTERNAL MEDICINE CLINIC | Facility: CLINIC | Age: 71
End: 2021-01-23

## 2021-01-28 ENCOUNTER — OFFICE VISIT (OUTPATIENT)
Dept: PODIATRY CLINIC | Facility: CLINIC | Age: 71
End: 2021-01-28
Payer: MEDICARE

## 2021-01-28 DIAGNOSIS — L60.0 INGROWN NAIL: Primary | ICD-10-CM

## 2021-01-28 PROCEDURE — 11750 EXCISION NAIL&NAIL MATRIX: CPT | Performed by: PODIATRIST

## 2021-01-28 PROCEDURE — 99203 OFFICE O/P NEW LOW 30 MIN: CPT | Performed by: PODIATRIST

## 2021-01-28 RX ORDER — CEFADROXIL 500 MG/1
500 CAPSULE ORAL 2 TIMES DAILY
Qty: 20 CAPSULE | Refills: 0 | Status: SHIPPED | OUTPATIENT
Start: 2021-01-28 | End: 2021-02-18

## 2021-01-28 NOTE — PATIENT INSTRUCTIONS
The day after toenail procedure remove all the dressings that were applied in the office, and discard. Soak the affected toe or toes twice daily  In warm soapy water using a dishwater soap. Soak for 15 minutes daily.     Gently pat the area dry, apply th

## 2021-01-28 NOTE — PROCEDURES
Verbal order given by Dr Dyana Lizarraga to draw up 5mls of Sensorcaine 0.5% for permanent removal of lateral border of right hallux nail with chemical destruction of the nail root. I was unable to obtain post injection vitals.

## 2021-01-28 NOTE — PROGRESS NOTES
Alva Robledo is a 78year old male. Patient presents with:  New Patient: right hallux nail is red and swollen after a pedicure 7+ days ago - pain scale 5/10.         HPI:     She relates that he had a pedicure done about 7 days ago and has since develop Tricia Tolliver MD at [facility] MAIN OR   • BACK SURGERY  5/31/16    L5-S1 ALIF   • COLONOSCOPY  12/2002 4/18/2006    exstensive diverticulosis coli  Dr Dianne Phan   • COLONOSCOPY  6/23/15    Dr.David Katheryn Arreola rpt in 3 years    • COLONOSCOPY  11/06/2018    Dr. Dianne Phan Normal skin temperature and turgor  2. Vascular: Dorsalis pedis two out of four bilateral and posterior tibial pulses two out of   four bilateral, capillary refill normal.   3. Musculoskeletal: All muscle groups are graded 5 out of 5 in the foot and ankle.

## 2021-01-29 ENCOUNTER — TELEPHONE (OUTPATIENT)
Dept: PODIATRY CLINIC | Facility: CLINIC | Age: 71
End: 2021-01-29

## 2021-01-29 NOTE — TELEPHONE ENCOUNTER
Pt called stating pt received a message to call. It has been taken care of. Pt does not need call back.

## 2021-02-03 RX ORDER — ESCITALOPRAM OXALATE 10 MG/1
TABLET ORAL
Qty: 90 TABLET | Refills: 1 | Status: SHIPPED | OUTPATIENT
Start: 2021-02-03 | End: 2021-08-13

## 2021-02-03 NOTE — TELEPHONE ENCOUNTER
Last visit- 08/31/2020     Last refill-  08/31/2020 escitalopram 10mg QTY90 1R    Last labs-  08/27/2020 psa screen, lipid, cmp, cbc   Future Appointments   Date Time Provider Calixto Lerma   2/8/2021  8:00 AM Jules Damon MD [facility]

## 2021-02-12 ENCOUNTER — MED REC SCAN ONLY (OUTPATIENT)
Dept: INTERNAL MEDICINE CLINIC | Facility: CLINIC | Age: 71
End: 2021-02-12

## 2021-02-18 ENCOUNTER — OFFICE VISIT (OUTPATIENT)
Dept: PODIATRY CLINIC | Facility: CLINIC | Age: 71
End: 2021-02-18
Payer: MEDICARE

## 2021-02-18 DIAGNOSIS — L60.0 INGROWN NAIL: Primary | ICD-10-CM

## 2021-02-18 PROCEDURE — 99213 OFFICE O/P EST LOW 20 MIN: CPT | Performed by: PODIATRIST

## 2021-02-18 RX ORDER — SODIUM FLUORIDE 6 MG/ML
PASTE, DENTIFRICE DENTAL
COMMUNITY
Start: 2021-02-15 | End: 2021-08-20 | Stop reason: ALTCHOICE

## 2021-02-18 NOTE — PROGRESS NOTES
Leonardo Fortune is a 78year old male. Patient presents with: Follow - Up: right hallux nail ck after nail procedure - denies any pain.         HPI:   Today for follow-up evaluation regarding his ingrown toenail also has a problem with a small lump on the Performed by Giovany Duran MD at [facility] OR   • BACK SURGERY  5/31/16    L5-S1 ALIF   • COLONOSCOPY  12/2002 4/18/2006    exstensive diverticulosis coli  Dr Vinny La   • COLONOSCOPY  6/23/15    Dr.David Angelina Austin rpt in 3 years    • COLONOSCOPY  11/06/201 distress  EXTREMITIES:   1. Integument: Normal skin temperature and turgor  2.  Vascular: Dorsalis pedis two out of four bilateral and posterior tibial pulses two out of   four bilateral, capillary refill normal.   3. Musculoskeletal: All muscle groups are

## 2021-02-22 ENCOUNTER — MED REC SCAN ONLY (OUTPATIENT)
Dept: INTERNAL MEDICINE CLINIC | Facility: CLINIC | Age: 71
End: 2021-02-22

## 2021-02-22 NOTE — PROGRESS NOTES
Placed progress note from [address] [facility] 2/19/21 Alan Bedoya on provider DIANE [place] for review.

## 2021-04-17 ENCOUNTER — MED REC SCAN ONLY (OUTPATIENT)
Dept: INTERNAL MEDICINE CLINIC | Facility: CLINIC | Age: 71
End: 2021-04-17

## 2021-04-22 NOTE — TELEPHONE ENCOUNTER
Last OV 8.31.20 w/ JL (annual pe)   Last PE 8.31.20   Last REFILL 10.9.20 Valsartan 80mg #90 0R  Last LABS 8.27.20 PSA, Lipid, CMP, CBC    Future Appointments   Date Time Provider Calixto Lerma   8/9/2021 10:00 AM Vanesa Lau MD [facility]

## 2021-04-23 RX ORDER — VALSARTAN 80 MG/1
TABLET ORAL
Qty: 90 TABLET | Refills: 0 | Status: SHIPPED | OUTPATIENT
Start: 2021-04-23 | End: 2021-10-15

## 2021-05-17 ENCOUNTER — MED REC SCAN ONLY (OUTPATIENT)
Dept: INTERNAL MEDICINE CLINIC | Facility: CLINIC | Age: 71
End: 2021-05-17

## 2021-05-17 NOTE — PROGRESS NOTES
Placed Opthalmology Consult from [address] [place] [address] 5/14/21 Radha Wooten MD  on provider JL desk for review.

## 2021-05-21 ENCOUNTER — TELEPHONE (OUTPATIENT)
Dept: INTERNAL MEDICINE CLINIC | Facility: CLINIC | Age: 71
End: 2021-05-21

## 2021-05-21 DIAGNOSIS — E78.00 PURE HYPERCHOLESTEROLEMIA: Primary | ICD-10-CM

## 2021-05-21 DIAGNOSIS — Z00.00 ROUTINE GENERAL MEDICAL EXAMINATION AT A HEALTH CARE FACILITY: ICD-10-CM

## 2021-05-21 DIAGNOSIS — I10 ESSENTIAL HYPERTENSION, BENIGN: ICD-10-CM

## 2021-05-21 DIAGNOSIS — Z12.5 SCREENING FOR MALIGNANT NEOPLASM OF PROSTATE: ICD-10-CM

## 2021-05-21 NOTE — TELEPHONE ENCOUNTER
Future Appointments   Date Time Provider Calixto Lerma   9/3/2021  8:40 AM Nay Mcwilliams MD [facility]         Orders to THE [facility] OF [facility]  aware must fast no call back required

## 2021-07-09 RX ORDER — PRAVASTATIN SODIUM 20 MG
TABLET ORAL
Qty: 90 TABLET | Refills: 0 | Status: SHIPPED | OUTPATIENT
Start: 2021-07-09 | End: 2021-10-12

## 2021-07-13 NOTE — ED AVS SNAPSHOT
Pratik Oviedo   MRN: [id number]    Department:  [facility] Emergency Department in [place]   Date of Visit:  10/13/2018           Disclosure     Insurance plans vary and the physician(s) referred by the ER may not be covered by your plan.  Please cont tell this physician (or your personal doctor if your instructions are to return to your personal doctor) about any new or lasting problems. The primary care or specialist physician will see patients referred from the [facility] Emergency Department.  Vannesa Knapp Anxiety    Depression    Fracture of right shoulder    High cholesterol    HTN (hypertension)    Osteoporosis

## 2021-07-17 ENCOUNTER — MED REC SCAN ONLY (OUTPATIENT)
Dept: INTERNAL MEDICINE CLINIC | Facility: CLINIC | Age: 71
End: 2021-07-17

## 2021-07-20 NOTE — TELEPHONE ENCOUNTER
Pt rescheduled for cpe and is now coming on below. Please advise on orders    Orders to THE [facility] Pt aware to get labs done no sooner than 2 weeks prior to the appt. Pt aware to fast.  No call back required.     Future Appointments   Date Time Provider Depart

## 2021-08-11 ENCOUNTER — OFFICE VISIT (OUTPATIENT)
Dept: FAMILY MEDICINE CLINIC | Facility: CLINIC | Age: 71
End: 2021-08-11
Payer: MEDICARE

## 2021-08-11 VITALS
DIASTOLIC BLOOD PRESSURE: 70 MMHG | HEIGHT: 69 IN | RESPIRATION RATE: 16 BRPM | BODY MASS INDEX: 29.33 KG/M2 | SYSTOLIC BLOOD PRESSURE: 110 MMHG | OXYGEN SATURATION: 99 % | WEIGHT: 198 LBS | TEMPERATURE: 98 F | HEART RATE: 83 BPM

## 2021-08-11 DIAGNOSIS — Z20.822 ENCOUNTER FOR LABORATORY TESTING FOR COVID-19 VIRUS: Primary | ICD-10-CM

## 2021-08-11 DIAGNOSIS — B34.9 VIRAL SYNDROME: ICD-10-CM

## 2021-08-11 PROCEDURE — 99213 OFFICE O/P EST LOW 20 MIN: CPT | Performed by: NURSE PRACTITIONER

## 2021-08-11 PROCEDURE — 3074F SYST BP LT 130 MM HG: CPT | Performed by: NURSE PRACTITIONER

## 2021-08-11 PROCEDURE — 3078F DIAST BP <80 MM HG: CPT | Performed by: NURSE PRACTITIONER

## 2021-08-11 PROCEDURE — 3008F BODY MASS INDEX DOCD: CPT | Performed by: NURSE PRACTITIONER

## 2021-08-11 NOTE — PROGRESS NOTES
Kacie Morrow is a 73year old male. HPI:   Patient presents with:  Upper Respiratory Infection: x 4 days      Patient had 3-4 diarrhea stools on Monday. He had a \"little bit of diarrhea\" Tuesday, but then it subsided.  He did not see blood or mucous with inhibited sexual excitement    • Sciatica 6/29/2012   • Sinusitis 12/11/2013   • Spinal stenosis, lumbar region, without neurogenic claudication 6/17/2009   • Spondylolisthesis of lumbar region 5/31/2016   • Squamous cell carcinoma    • Unspecified es lungs, it may cause cough, sore throat, congestion, runny nose, headache, earache and other ear symptoms, or shortness of breath. If it settles in your stomach and intestinal tract, it may cause nausea, vomiting, cramping, and diarrhea.  Sometimes it causes · Over-the-counter remedies won't shorten the length of the illness but may be helpful for symptoms such as cough, sore throat, nasal and sinus congestion, or diarrhea. Don't use decongestants if you have high blood pressure.   Follow-up care  Follow up wit

## 2021-08-12 LAB — SARS-COV-2 RNA RESP QL NAA+PROBE: NOT DETECTED

## 2021-08-13 ENCOUNTER — LAB ENCOUNTER (OUTPATIENT)
Dept: LAB | Age: 71
End: 2021-08-13
Attending: INTERNAL MEDICINE
Payer: MEDICARE

## 2021-08-13 ENCOUNTER — MED REC SCAN ONLY (OUTPATIENT)
Dept: INTERNAL MEDICINE CLINIC | Facility: CLINIC | Age: 71
End: 2021-08-13

## 2021-08-13 DIAGNOSIS — E78.00 PURE HYPERCHOLESTEROLEMIA: ICD-10-CM

## 2021-08-13 DIAGNOSIS — I10 ESSENTIAL HYPERTENSION, BENIGN: ICD-10-CM

## 2021-08-13 DIAGNOSIS — Z12.5 SCREENING FOR MALIGNANT NEOPLASM OF PROSTATE: ICD-10-CM

## 2021-08-13 DIAGNOSIS — Z00.00 ROUTINE GENERAL MEDICAL EXAMINATION AT A HEALTH CARE FACILITY: ICD-10-CM

## 2021-08-13 LAB
ALBUMIN SERPL-MCNC: 3.7 G/DL (ref 3.4–5)
ALBUMIN/GLOB SERPL: 1.3 {RATIO} (ref 1–2)
ALP LIVER SERPL-CCNC: 59 U/L
ALT SERPL-CCNC: 35 U/L
ANION GAP SERPL CALC-SCNC: 5 MMOL/L (ref 0–18)
AST SERPL-CCNC: 19 U/L (ref 15–37)
BASOPHILS # BLD AUTO: 0.04 X10(3) UL (ref 0–0.2)
BASOPHILS NFR BLD AUTO: 0.7 %
BILIRUB SERPL-MCNC: 0.5 MG/DL (ref 0.1–2)
BUN BLD-MCNC: 16 MG/DL (ref 7–18)
CALCIUM BLD-MCNC: 9.1 MG/DL (ref 8.5–10.1)
CHLORIDE SERPL-SCNC: 105 MMOL/L (ref 98–112)
CHOLEST SMN-MCNC: 137 MG/DL (ref ?–200)
CO2 SERPL-SCNC: 28 MMOL/L (ref 21–32)
COMPLEXED PSA SERPL-MCNC: 3.86 NG/ML (ref ?–4)
CREAT BLD-MCNC: 0.83 MG/DL
EOSINOPHIL # BLD AUTO: 0.2 X10(3) UL (ref 0–0.7)
EOSINOPHIL NFR BLD AUTO: 3.3 %
ERYTHROCYTE [DISTWIDTH] IN BLOOD BY AUTOMATED COUNT: 12 %
GLOBULIN PLAS-MCNC: 2.8 G/DL (ref 2.8–4.4)
GLUCOSE BLD-MCNC: 85 MG/DL (ref 70–99)
HCT VFR BLD AUTO: 41.7 %
HDLC SERPL-MCNC: 40 MG/DL (ref 40–59)
HGB BLD-MCNC: 14 G/DL
IMM GRANULOCYTES # BLD AUTO: 0.01 X10(3) UL (ref 0–1)
IMM GRANULOCYTES NFR BLD: 0.2 %
LDLC SERPL CALC-MCNC: 78 MG/DL (ref ?–100)
LYMPHOCYTES # BLD AUTO: 1.97 X10(3) UL (ref 1–4)
LYMPHOCYTES NFR BLD AUTO: 32.2 %
M PROTEIN MFR SERPL ELPH: 6.5 G/DL (ref 6.4–8.2)
MCH RBC QN AUTO: 32.6 PG (ref 26–34)
MCHC RBC AUTO-ENTMCNC: 33.6 G/DL (ref 31–37)
MCV RBC AUTO: 97.2 FL
MONOCYTES # BLD AUTO: 0.61 X10(3) UL (ref 0.1–1)
MONOCYTES NFR BLD AUTO: 10 %
NEUTROPHILS # BLD AUTO: 3.28 X10 (3) UL (ref 1.5–7.7)
NEUTROPHILS # BLD AUTO: 3.28 X10(3) UL (ref 1.5–7.7)
NEUTROPHILS NFR BLD AUTO: 53.6 %
NONHDLC SERPL-MCNC: 97 MG/DL (ref ?–130)
OSMOLALITY SERPL CALC.SUM OF ELEC: 286 MOSM/KG (ref 275–295)
PATIENT FASTING Y/N/NP: YES
PATIENT FASTING Y/N/NP: YES
PLATELET # BLD AUTO: 222 10(3)UL (ref 150–450)
POTASSIUM SERPL-SCNC: 3.8 MMOL/L (ref 3.5–5.1)
RBC # BLD AUTO: 4.29 X10(6)UL
SODIUM SERPL-SCNC: 138 MMOL/L (ref 136–145)
TRIGL SERPL-MCNC: 100 MG/DL (ref 30–149)
VLDLC SERPL CALC-MCNC: 16 MG/DL (ref 0–30)
WBC # BLD AUTO: 6.1 X10(3) UL (ref 4–11)

## 2021-08-13 PROCEDURE — 36415 COLL VENOUS BLD VENIPUNCTURE: CPT

## 2021-08-13 PROCEDURE — 80053 COMPREHEN METABOLIC PANEL: CPT

## 2021-08-13 PROCEDURE — 85025 COMPLETE CBC W/AUTO DIFF WBC: CPT

## 2021-08-13 PROCEDURE — 80061 LIPID PANEL: CPT

## 2021-08-13 RX ORDER — ESCITALOPRAM OXALATE 10 MG/1
TABLET ORAL
Qty: 90 TABLET | Refills: 1 | Status: SHIPPED | OUTPATIENT
Start: 2021-08-13

## 2021-08-13 NOTE — TELEPHONE ENCOUNTER
Been Following JL  Last OV 8/31/20   Last CPE 8/31/20  Last Labs PSA, Lipid, CMP, CBC 8/13/21  Appt Due 6mon, F/U    Last Rx fill escitalopram 10mg #90 1R 2/3/21    Future Appointments   Date Time Provider Calixto Lerma   8/20/2021 10:00 AM Arturo Livers

## 2021-08-20 ENCOUNTER — OFFICE VISIT (OUTPATIENT)
Dept: INTERNAL MEDICINE CLINIC | Facility: CLINIC | Age: 71
End: 2021-08-20
Payer: MEDICARE

## 2021-08-20 VITALS
TEMPERATURE: 98 F | BODY MASS INDEX: 30.46 KG/M2 | WEIGHT: 201 LBS | SYSTOLIC BLOOD PRESSURE: 126 MMHG | OXYGEN SATURATION: 96 % | RESPIRATION RATE: 16 BRPM | HEIGHT: 68 IN | HEART RATE: 80 BPM | DIASTOLIC BLOOD PRESSURE: 64 MMHG

## 2021-08-20 DIAGNOSIS — R97.20 RISING PSA LEVEL: ICD-10-CM

## 2021-08-20 DIAGNOSIS — Z00.00 ENCOUNTER FOR ANNUAL HEALTH EXAMINATION: Primary | ICD-10-CM

## 2021-08-20 DIAGNOSIS — F41.9 ANXIETY: ICD-10-CM

## 2021-08-20 DIAGNOSIS — Z85.038 HISTORY OF COLON CANCER: ICD-10-CM

## 2021-08-20 DIAGNOSIS — Z98.1 S/P LUMBAR FUSION: ICD-10-CM

## 2021-08-20 DIAGNOSIS — M48.061 LUMBAR FORAMINAL STENOSIS: ICD-10-CM

## 2021-08-20 DIAGNOSIS — E78.00 PURE HYPERCHOLESTEROLEMIA: ICD-10-CM

## 2021-08-20 DIAGNOSIS — M62.08 DIASTASIS RECTI: ICD-10-CM

## 2021-08-20 DIAGNOSIS — K57.90 DIVERTICULOSIS: ICD-10-CM

## 2021-08-20 DIAGNOSIS — M41.26 IDIOPATHIC SCOLIOSIS OF LUMBAR REGION: ICD-10-CM

## 2021-08-20 DIAGNOSIS — I10 ESSENTIAL HYPERTENSION, BENIGN: ICD-10-CM

## 2021-08-20 PROCEDURE — G0439 PPPS, SUBSEQ VISIT: HCPCS | Performed by: INTERNAL MEDICINE

## 2021-08-20 PROCEDURE — 99397 PER PM REEVAL EST PAT 65+ YR: CPT | Performed by: INTERNAL MEDICINE

## 2021-08-20 PROCEDURE — 3074F SYST BP LT 130 MM HG: CPT | Performed by: INTERNAL MEDICINE

## 2021-08-20 PROCEDURE — 3008F BODY MASS INDEX DOCD: CPT | Performed by: INTERNAL MEDICINE

## 2021-08-20 PROCEDURE — 96160 PT-FOCUSED HLTH RISK ASSMT: CPT | Performed by: INTERNAL MEDICINE

## 2021-08-20 PROCEDURE — 3078F DIAST BP <80 MM HG: CPT | Performed by: INTERNAL MEDICINE

## 2021-08-20 RX ORDER — MULTIVIT-MIN/IRON FUM/FOLIC AC 7.5 MG-4
1 TABLET ORAL DAILY
COMMUNITY

## 2021-08-20 NOTE — PROGRESS NOTES
HPI:   Roberta Hampton is a 73year old male who presents for a Medicare Subsequent Annual Wellness visit (Pt already had Initial Annual Wellness). The patient has been in his usual state of health. He denies any acute concerns.   He enjoys an active 8/31/2020.          Patient Care Team: Patient Care Team:  Francisco Funk MD as PCP - General    Patient Active Problem List:     Diverticulosis     Pure hypercholesterolemia     Essential hypertension, benign     Reactive depression     Lumbar foraminal wound and without mention of infection, High cholesterol, HYPERTENSION, HYPERTRIGLYCERIDEMIA, Lumbar scoliosis (5/23/2014), Melanoma ([address] Melanoma of skin, site unspecified (6/29/2012), Psychosexual dysfunction with inhibited sexual excitement, Sciatica appears stated age   Head:  Normocephalic, without obvious abnormality, atraumatic   Eyes:  PERRL, conjunctiva/corneas clear, EOM's intact, both eyes   Ears:  Normal TM's and external ear canals, both ears   Nose:  Deferred    Throat:  Deferred   Neck: Sup in father:  Up-to-date with screening colonoscopy. Neck study due in November 2021.     Dyslipidemia:  Stable/controlled  Continue pravastatin at current dose    Anxiety:  Stable/controlled  Continue current Escitalopram dose    Hypertension:  Stable/contr beneficiaries without apparent signs or symptoms of cardiovascular disease Lab Results   Component Value Date    CHOLEST 137 08/13/2021    HDL 40 08/13/2021    LDL 78 08/13/2021    TRIG 100 08/13/2021         Electrocardiogram (EKG)   Covered if needed at diuretics, anticonvulsants)    Potassium Annually Lab Results   Component Value Date    K 3.8 08/13/2021         Creatinine   Annually Lab Results   Component Value Date    CREATSERUM 0.83 08/13/2021         BUN Annually Lab Results   Component Value Date

## 2021-08-20 NOTE — PATIENT INSTRUCTIONS
Uma Sanchez's SCREENING SCHEDULE   Tests on this list are recommended by your physician but may not be covered, or covered at this frequency, by your insurer. Please check with your insurance carrier before scheduling to verify coverage.    PREVENT Pneumococcal Each vaccine ([id number] & [id number]) covered once after 65 Prevnar 13: 05/05/2017    [id number]: 05/30/2018     No recommendations at this time    Hepatitis B One screening covered for patients with certain risk factors   -  No recommend

## 2021-09-09 ENCOUNTER — TELEPHONE (OUTPATIENT)
Dept: INTERNAL MEDICINE CLINIC | Facility: CLINIC | Age: 71
End: 2021-09-09

## 2021-09-09 DIAGNOSIS — R97.20 RISING PSA LEVEL: Primary | ICD-10-CM

## 2021-09-09 NOTE — TELEPHONE ENCOUNTER
Patient seen in office 8/20/2021 by AD- please advise if able to repeat PSA r/t increasing value. Current level 3.86- last year was 1.06    Family hx of breast cancer and colon cancer.

## 2021-09-09 NOTE — TELEPHONE ENCOUNTER
Patient referred to urology for elevated PSA and is not able to get in for 6 weeks. Patient is requesting another PSA be done prior to seening specialist. Benjamin Machado to notify pt via [facility].

## 2021-09-09 NOTE — TELEPHONE ENCOUNTER
It is soon, however, given the degree of elevation as compared with prior study I have ordered a repeat study.

## 2021-09-10 ENCOUNTER — LAB ENCOUNTER (OUTPATIENT)
Dept: LAB | Age: 71
End: 2021-09-10
Attending: INTERNAL MEDICINE
Payer: MEDICARE

## 2021-09-10 DIAGNOSIS — R97.20 RISING PSA LEVEL: ICD-10-CM

## 2021-09-10 LAB — PSA SERPL-MCNC: 1.85 NG/ML (ref ?–4)

## 2021-09-10 PROCEDURE — 84153 ASSAY OF PSA TOTAL: CPT

## 2021-09-10 PROCEDURE — 36415 COLL VENOUS BLD VENIPUNCTURE: CPT

## 2021-09-10 NOTE — TELEPHONE ENCOUNTER
Patient notified AD ordered a PSA lab to complete prior to his appt with Dr Lukas Petersen understanding.

## 2021-10-07 NOTE — H&P
HPI:     Ben La [facility] [place]) is a 73year old male with a  anxiety/depression, HTN, HL, lumbar scoliosis with sciatica. He presents as a consult from Dr Yesenia Samuel office with rising PSA, BPH/LUTS.     Prior PSAs:  - 1.85 9/10/21  - 3.86 8/13/21 HYPERTENSION    • HYPERTRIGLYCERIDEMIA    • Lumbar scoliosis 5/23/2014   • Melanoma ([facility] [address]     2007, resected   • Melanoma of skin, site unspecified 6/29/2012   • Psychosexual dysfunction with inhibited sexual excitement    • Sciatica 6/29/2012   • Sinusiti Tab TAKE 1 TABLET(10 MG) BY MOUTH EVERY DAY 90 tablet 1   • ASPIRIN 81 OR Take by mouth.  (Patient not taking: Reported on 10/19/2021)         Allergies:    Lipitor [Atorvastat*    MYALGIA      ROS:     A comprehensive 10 point review of systems was complet

## 2021-10-11 ENCOUNTER — MED REC SCAN ONLY (OUTPATIENT)
Dept: INTERNAL MEDICINE CLINIC | Facility: CLINIC | Age: 71
End: 2021-10-11

## 2021-10-12 RX ORDER — PRAVASTATIN SODIUM 20 MG
TABLET ORAL
Qty: 90 TABLET | Refills: 1 | Status: SHIPPED | OUTPATIENT
Start: 2021-10-12

## 2021-10-15 RX ORDER — VALSARTAN 80 MG/1
TABLET ORAL
Qty: 90 TABLET | Refills: 1 | Status: SHIPPED | OUTPATIENT
Start: 2021-10-15

## 2021-10-19 ENCOUNTER — OFFICE VISIT (OUTPATIENT)
Dept: SURGERY | Facility: CLINIC | Age: 71
End: 2021-10-19
Payer: MEDICARE

## 2021-10-19 DIAGNOSIS — R97.20 RISING PSA LEVEL: Primary | ICD-10-CM

## 2021-10-19 DIAGNOSIS — N13.8 BPH WITH OBSTRUCTION/LOWER URINARY TRACT SYMPTOMS: ICD-10-CM

## 2021-10-19 DIAGNOSIS — N40.1 BPH WITH OBSTRUCTION/LOWER URINARY TRACT SYMPTOMS: ICD-10-CM

## 2021-10-19 PROCEDURE — 81003 URINALYSIS AUTO W/O SCOPE: CPT | Performed by: UROLOGY

## 2021-10-19 PROCEDURE — 51798 US URINE CAPACITY MEASURE: CPT | Performed by: UROLOGY

## 2021-10-19 PROCEDURE — 99204 OFFICE O/P NEW MOD 45 MIN: CPT | Performed by: UROLOGY

## 2021-10-19 RX ORDER — FINASTERIDE 5 MG/1
5 TABLET, FILM COATED ORAL DAILY
Qty: 90 TABLET | Refills: 6 | Status: SHIPPED | OUTPATIENT
Start: 2021-10-19

## 2021-10-19 RX ORDER — VIT A/VIT C/VIT E/ZINC/COPPER 2148-113
TABLET ORAL
COMMUNITY

## 2021-11-04 RX ORDER — SODIUM PHOSPHATE, MONOBASIC, MONOHYDRATE, SODIUM PHOSPHATE, DIBASIC ANHYDROUS 1.102; .398 G/1; G/1
TABLET ORAL
Qty: 28 TABLET | Refills: 0 | Status: SHIPPED | OUTPATIENT
Start: 2021-11-04 | End: 2021-11-12

## 2021-11-04 RX ORDER — SODIUM, POTASSIUM,MAG SULFATES 17.5-3.13G
SOLUTION, RECONSTITUTED, ORAL ORAL
Qty: 1 EACH | Refills: 0 | Status: SHIPPED | OUTPATIENT
Start: 2021-11-04

## 2021-11-08 ENCOUNTER — MED REC SCAN ONLY (OUTPATIENT)
Dept: INTERNAL MEDICINE CLINIC | Facility: CLINIC | Age: 71
End: 2021-11-08

## 2021-11-12 RX ORDER — SODIUM PHOSPHATE, MONOBASIC, MONOHYDRATE, SODIUM PHOSPHATE, DIBASIC ANHYDROUS 1.102; .398 G/1; G/1
TABLET ORAL
Qty: 28 TABLET | Refills: 0 | Status: SHIPPED | OUTPATIENT
Start: 2021-11-12

## 2021-11-22 ENCOUNTER — ORDER TRANSCRIPTION (OUTPATIENT)
Dept: ADMINISTRATIVE | Facility: HOSPITAL | Age: 71
End: 2021-11-22

## 2021-11-22 DIAGNOSIS — Z01.818 PRE-PROCEDURAL EXAMINATION: Primary | ICD-10-CM

## 2021-11-22 DIAGNOSIS — Z11.59 ENCOUNTER FOR SCREENING FOR OTHER VIRAL DISEASES: ICD-10-CM

## 2021-11-22 DIAGNOSIS — M54.16 LUMBAR RADICULOPATHY: Primary | ICD-10-CM

## 2021-11-22 DIAGNOSIS — M43.16 SPONDYLOLISTHESIS OF LUMBAR REGION: ICD-10-CM

## 2021-12-09 ENCOUNTER — MED REC SCAN ONLY (OUTPATIENT)
Dept: INTERNAL MEDICINE CLINIC | Facility: CLINIC | Age: 71
End: 2021-12-09

## 2021-12-13 ENCOUNTER — TELEPHONE (OUTPATIENT)
Dept: SURGERY | Facility: CLINIC | Age: 71
End: 2021-12-13

## 2021-12-14 ENCOUNTER — LAB REQUISITION (OUTPATIENT)
Dept: LAB | Facility: HOSPITAL | Age: 71
End: 2021-12-14
Payer: MEDICARE

## 2021-12-14 DIAGNOSIS — Z80.0 FAMILY HISTORY OF MALIGNANT NEOPLASM OF DIGESTIVE ORGANS: ICD-10-CM

## 2021-12-14 PROCEDURE — 88305 TISSUE EXAM BY PATHOLOGIST: CPT | Performed by: COLON & RECTAL SURGERY

## 2021-12-14 PROCEDURE — 88305 TISSUE EXAM BY PATHOLOGIST: CPT | Performed by: RADIOLOGY

## 2021-12-16 ENCOUNTER — TELEPHONE (OUTPATIENT)
Dept: INTERNAL MEDICINE CLINIC | Facility: CLINIC | Age: 71
End: 2021-12-16

## 2021-12-23 ENCOUNTER — PATIENT OUTREACH (OUTPATIENT)
Dept: SURGERY | Facility: CLINIC | Age: 71
End: 2021-12-23

## 2021-12-23 NOTE — PROGRESS NOTES
Per the colonoscopy results sheet and op note, patient is due for a 3 year colonoscopy recall. Recall placed. Health maintenance updated. Patient notified. No further questions from patient at this time. Will call office with any questions or concerns.

## 2022-01-06 ENCOUNTER — MED REC SCAN ONLY (OUTPATIENT)
Dept: INTERNAL MEDICINE CLINIC | Facility: CLINIC | Age: 72
End: 2022-01-06

## 2022-02-10 RX ORDER — ESCITALOPRAM OXALATE 10 MG/1
TABLET ORAL
Qty: 90 TABLET | Refills: 1 | Status: SHIPPED | OUTPATIENT
Start: 2022-02-10

## 2022-02-10 NOTE — TELEPHONE ENCOUNTER
Return in 6 months (on 2/20/2022).     Sydnie Maldonado MD  You; Garcia Borja MD 41 minutes ago (11:45 AM)         Dr. Ada Sheikh saw pt last for awv 8/21, please address as discussed    Message text

## 2022-03-07 ENCOUNTER — LAB ENCOUNTER (OUTPATIENT)
Dept: LAB | Age: 72
End: 2022-03-07
Attending: PHYSICIAN ASSISTANT
Payer: MEDICARE

## 2022-03-07 DIAGNOSIS — Z11.59 ENCOUNTER FOR SCREENING FOR OTHER VIRAL DISEASES: ICD-10-CM

## 2022-03-07 DIAGNOSIS — Z01.818 PRE-PROCEDURAL EXAMINATION: ICD-10-CM

## 2022-03-07 LAB — SARS-COV-2 RNA RESP QL NAA+PROBE: NOT DETECTED

## 2022-03-08 ENCOUNTER — MED REC SCAN ONLY (OUTPATIENT)
Dept: INTERNAL MEDICINE CLINIC | Facility: CLINIC | Age: 72
End: 2022-03-08

## 2022-03-10 ENCOUNTER — NURSE ONLY (OUTPATIENT)
Dept: LAB | Facility: HOSPITAL | Age: 72
End: 2022-03-10
Attending: PHYSICIAN ASSISTANT
Payer: MEDICARE

## 2022-03-10 ENCOUNTER — HOSPITAL ENCOUNTER (OUTPATIENT)
Dept: GENERAL RADIOLOGY | Facility: HOSPITAL | Age: 72
Discharge: HOME | End: 2022-03-10
Attending: PHYSICIAN ASSISTANT
Payer: MEDICARE

## 2022-03-10 ENCOUNTER — HOSPITAL ENCOUNTER (OUTPATIENT)
Dept: CT IMAGING | Facility: HOSPITAL | Age: 72
Discharge: HOME | End: 2022-03-10
Attending: PHYSICIAN ASSISTANT
Payer: MEDICARE

## 2022-03-10 VITALS
TEMPERATURE: 97 F | OXYGEN SATURATION: 96 % | HEART RATE: 79 BPM | BODY MASS INDEX: 28.88 KG/M2 | DIASTOLIC BLOOD PRESSURE: 84 MMHG | RESPIRATION RATE: 18 BRPM | SYSTOLIC BLOOD PRESSURE: 148 MMHG | WEIGHT: 195 LBS | HEIGHT: 69 IN

## 2022-03-10 VITALS
DIASTOLIC BLOOD PRESSURE: 85 MMHG | SYSTOLIC BLOOD PRESSURE: 140 MMHG | RESPIRATION RATE: 16 BRPM | OXYGEN SATURATION: 98 % | HEART RATE: 92 BPM | TEMPERATURE: 98 F

## 2022-03-10 DIAGNOSIS — M43.16 SPONDYLOLISTHESIS OF LUMBAR REGION: ICD-10-CM

## 2022-03-10 DIAGNOSIS — M54.16 LUMBAR RADICULOPATHY: ICD-10-CM

## 2022-03-10 DIAGNOSIS — M54.16 LUMBAR RADICULOPATHY: Primary | ICD-10-CM

## 2022-03-10 LAB
ERYTHROCYTE [DISTWIDTH] IN BLOOD BY AUTOMATED COUNT: 12 %
HCT VFR BLD AUTO: 45.5 %
HGB BLD-MCNC: 15.9 G/DL
INR BLD: 0.92 (ref 0.8–1.2)
MCH RBC QN AUTO: 33.8 PG (ref 26–34)
MCHC RBC AUTO-ENTMCNC: 34.9 G/DL (ref 31–37)
MCV RBC AUTO: 96.8 FL
PLATELET # BLD AUTO: 241 10(3)UL (ref 150–450)
PROTHROMBIN TIME: 12.3 SECONDS (ref 11.6–14.8)
RBC # BLD AUTO: 4.7 X10(6)UL
WBC # BLD AUTO: 6.4 X10(3) UL (ref 4–11)

## 2022-03-10 PROCEDURE — 72132 CT LUMBAR SPINE W/DYE: CPT | Performed by: PHYSICIAN ASSISTANT

## 2022-03-10 PROCEDURE — 62304 MYELOGRAPHY LUMBAR INJECTION: CPT | Performed by: PHYSICIAN ASSISTANT

## 2022-03-10 PROCEDURE — 85027 COMPLETE CBC AUTOMATED: CPT | Performed by: RADIOLOGY

## 2022-03-10 PROCEDURE — 36415 COLL VENOUS BLD VENIPUNCTURE: CPT

## 2022-03-10 PROCEDURE — 85610 PROTHROMBIN TIME: CPT

## 2022-03-10 RX ORDER — DIAZEPAM 5 MG/1
10 TABLET ORAL
Status: COMPLETED | OUTPATIENT
Start: 2022-03-10 | End: 2022-03-10

## 2022-03-10 RX ORDER — DIAZEPAM 5 MG/1
TABLET ORAL
Status: COMPLETED
Start: 2022-03-10 | End: 2022-03-10

## 2022-03-10 RX ORDER — SODIUM CHLORIDE 9 MG/ML
INJECTION, SOLUTION INTRAVENOUS CONTINUOUS
Status: DISCONTINUED | OUTPATIENT
Start: 2022-03-10 | End: 2022-03-12

## 2022-03-10 RX ADMIN — DIAZEPAM 10 MG: 5 TABLET ORAL at 07:45:00

## 2022-03-10 NOTE — PROCEDURES
[facility]  Procedure Note    Jessenia Staples Patient Status:  Outpatient    3/1/1950 MRN [id number]   [facility] X-RAY Attending Jerzy Maki PA-C   Hosp Day # 0 PCP Angelica Groves MD     Procedure: lumbar myelogram    Pre-Procedure Diagnosis:  Low back and right leg pain    Post-Procedure Diagnosis: same    Anesthesia:  Local    Findings:  Good thecal sac fill    Specimens: none    Blood Loss:  none    Tourniquet Time: n/a  Complications:  None  Drains:  none    Secondary Diagnosis:  none    Arnoldo Ford MD  3/10/2022

## 2022-03-10 NOTE — IMAGING NOTE
Pt scheduled for myelogram with Dr. Kourtney Paz. Took valium as ordered. Confirmed ride home. Report called to Max in [facility].

## 2022-03-11 ENCOUNTER — HOSPITAL ENCOUNTER (EMERGENCY)
Age: 72
Discharge: HOME | End: 2022-03-11
Attending: EMERGENCY MEDICINE
Payer: MEDICARE

## 2022-03-11 VITALS
RESPIRATION RATE: 16 BRPM | BODY MASS INDEX: 28.88 KG/M2 | HEART RATE: 89 BPM | HEIGHT: 69 IN | SYSTOLIC BLOOD PRESSURE: 127 MMHG | TEMPERATURE: 97 F | OXYGEN SATURATION: 98 % | WEIGHT: 195 LBS | DIASTOLIC BLOOD PRESSURE: 77 MMHG

## 2022-03-11 DIAGNOSIS — I10 PRIMARY HYPERTENSION: Primary | ICD-10-CM

## 2022-03-11 LAB
ATRIAL RATE: 82 BPM
P AXIS: 24 DEGREES
P-R INTERVAL: 154 MS
Q-T INTERVAL: 392 MS
QRS DURATION: 134 MS
QTC CALCULATION (BEZET): 457 MS
R AXIS: -55 DEGREES
T AXIS: 4 DEGREES

## 2022-03-11 PROCEDURE — 93010 ELECTROCARDIOGRAM REPORT: CPT

## 2022-03-11 PROCEDURE — 93005 ELECTROCARDIOGRAM TRACING: CPT

## 2022-03-11 PROCEDURE — 99283 EMERGENCY DEPT VISIT LOW MDM: CPT

## 2022-03-11 PROCEDURE — 99284 EMERGENCY DEPT VISIT MOD MDM: CPT

## 2022-03-11 NOTE — ED INITIAL ASSESSMENT (HPI)
Checked BP at home today was elevated 159/101-  Had a CT myelogram yesterday and BP with elevated before procedure but normal after  Denies any headache, CP, dizziness or other complaints

## 2022-03-15 ENCOUNTER — TELEPHONE (OUTPATIENT)
Dept: INTERNAL MEDICINE CLINIC | Facility: CLINIC | Age: 72
End: 2022-03-15

## 2022-03-29 ENCOUNTER — TELEPHONE (OUTPATIENT)
Dept: INTERNAL MEDICINE CLINIC | Facility: CLINIC | Age: 72
End: 2022-03-29

## 2022-03-29 DIAGNOSIS — R97.20 RISING PSA LEVEL: ICD-10-CM

## 2022-03-29 DIAGNOSIS — E78.00 PURE HYPERCHOLESTEROLEMIA: ICD-10-CM

## 2022-03-29 DIAGNOSIS — I10 ESSENTIAL HYPERTENSION, BENIGN: ICD-10-CM

## 2022-03-29 DIAGNOSIS — Z00.00 ROUTINE GENERAL MEDICAL EXAMINATION AT A HEALTH CARE FACILITY: Primary | ICD-10-CM

## 2022-03-29 NOTE — TELEPHONE ENCOUNTER
Future Appointments   Date Time Provider Calixto Maria Guadalupe   6/17/2022 12:20 PM Adelia Rivas MD [facility]     Orders to    THE [facility] OF [facility]         aware must fast no call back required

## 2022-03-31 ENCOUNTER — OFFICE VISIT (OUTPATIENT)
Dept: INTERNAL MEDICINE CLINIC | Facility: CLINIC | Age: 72
End: 2022-03-31
Payer: MEDICARE

## 2022-03-31 VITALS
OXYGEN SATURATION: 97 % | DIASTOLIC BLOOD PRESSURE: 68 MMHG | HEART RATE: 84 BPM | WEIGHT: 217.38 LBS | HEIGHT: 69 IN | SYSTOLIC BLOOD PRESSURE: 132 MMHG | BODY MASS INDEX: 32.2 KG/M2

## 2022-03-31 DIAGNOSIS — I10 ESSENTIAL HYPERTENSION, BENIGN: Primary | ICD-10-CM

## 2022-03-31 DIAGNOSIS — E66.9 OBESITY (BMI 30-39.9): ICD-10-CM

## 2022-03-31 PROCEDURE — 3075F SYST BP GE 130 - 139MM HG: CPT | Performed by: FAMILY MEDICINE

## 2022-03-31 PROCEDURE — 3008F BODY MASS INDEX DOCD: CPT | Performed by: FAMILY MEDICINE

## 2022-03-31 PROCEDURE — 99214 OFFICE O/P EST MOD 30 MIN: CPT | Performed by: FAMILY MEDICINE

## 2022-03-31 PROCEDURE — 3078F DIAST BP <80 MM HG: CPT | Performed by: FAMILY MEDICINE

## 2022-04-11 RX ORDER — PRAVASTATIN SODIUM 20 MG
TABLET ORAL
Qty: 90 TABLET | Refills: 0 | Status: SHIPPED | OUTPATIENT
Start: 2022-04-11

## 2022-04-13 RX ORDER — VALSARTAN 80 MG/1
TABLET ORAL
Qty: 90 TABLET | Refills: 1 | Status: SHIPPED | OUTPATIENT
Start: 2022-04-13

## 2022-04-14 ENCOUNTER — MED REC SCAN ONLY (OUTPATIENT)
Dept: INTERNAL MEDICINE CLINIC | Facility: CLINIC | Age: 72
End: 2022-04-14

## 2022-05-03 ENCOUNTER — MED REC SCAN ONLY (OUTPATIENT)
Dept: INTERNAL MEDICINE CLINIC | Facility: CLINIC | Age: 72
End: 2022-05-03

## 2022-05-04 ENCOUNTER — LAB ENCOUNTER (OUTPATIENT)
Dept: LAB | Age: 72
End: 2022-05-04
Attending: UROLOGY
Payer: MEDICARE

## 2022-05-04 DIAGNOSIS — R97.20 RISING PSA LEVEL: ICD-10-CM

## 2022-05-04 LAB — PSA SERPL-MCNC: 0.48 NG/ML (ref ?–4)

## 2022-05-04 PROCEDURE — 36415 COLL VENOUS BLD VENIPUNCTURE: CPT

## 2022-05-04 PROCEDURE — 84153 ASSAY OF PSA TOTAL: CPT

## 2022-05-10 ENCOUNTER — OFFICE VISIT (OUTPATIENT)
Dept: SURGERY | Facility: CLINIC | Age: 72
End: 2022-05-10
Payer: MEDICARE

## 2022-05-10 DIAGNOSIS — N40.1 BPH WITH OBSTRUCTION/LOWER URINARY TRACT SYMPTOMS: ICD-10-CM

## 2022-05-10 DIAGNOSIS — R35.0 URINARY FREQUENCY: ICD-10-CM

## 2022-05-10 DIAGNOSIS — N13.8 BPH WITH OBSTRUCTION/LOWER URINARY TRACT SYMPTOMS: ICD-10-CM

## 2022-05-10 DIAGNOSIS — R97.20 RISING PSA LEVEL: Primary | ICD-10-CM

## 2022-05-10 LAB
APPEARANCE: CLEAR
BILIRUBIN: NEGATIVE
GLUCOSE (URINE DIPSTICK): NEGATIVE MG/DL
KETONES (URINE DIPSTICK): NEGATIVE MG/DL
LEUKOCYTES: NEGATIVE
MULTISTIX LOT#: NORMAL NUMERIC
NITRITE, URINE: NEGATIVE
OCCULT BLOOD: NEGATIVE
PH, URINE: 7.5 (ref 4.5–8)
PROTEIN (URINE DIPSTICK): NEGATIVE MG/DL
SPECIFIC GRAVITY: 1.01 (ref 1–1.03)
URINE-COLOR: YELLOW
UROBILINOGEN,SEMI-QN: 0.2 MG/DL (ref 0–1.9)

## 2022-05-10 PROCEDURE — 81003 URINALYSIS AUTO W/O SCOPE: CPT | Performed by: UROLOGY

## 2022-05-10 PROCEDURE — 99214 OFFICE O/P EST MOD 30 MIN: CPT | Performed by: UROLOGY

## 2022-05-10 RX ORDER — FINASTERIDE 5 MG/1
5 TABLET, FILM COATED ORAL DAILY
Qty: 90 TABLET | Refills: 6 | Status: SHIPPED | OUTPATIENT
Start: 2022-05-10

## 2022-06-13 ENCOUNTER — LAB ENCOUNTER (OUTPATIENT)
Dept: LAB | Age: 72
End: 2022-06-13
Attending: INTERNAL MEDICINE
Payer: MEDICARE

## 2022-06-13 DIAGNOSIS — Z00.00 ROUTINE GENERAL MEDICAL EXAMINATION AT A HEALTH CARE FACILITY: ICD-10-CM

## 2022-06-13 DIAGNOSIS — I10 ESSENTIAL HYPERTENSION, BENIGN: ICD-10-CM

## 2022-06-13 DIAGNOSIS — E78.00 PURE HYPERCHOLESTEROLEMIA: ICD-10-CM

## 2022-06-13 LAB
ALBUMIN SERPL-MCNC: 3.7 G/DL (ref 3.4–5)
ALBUMIN/GLOB SERPL: 1.2 {RATIO} (ref 1–2)
ALP LIVER SERPL-CCNC: 63 U/L
ALT SERPL-CCNC: 31 U/L
ANION GAP SERPL CALC-SCNC: 4 MMOL/L (ref 0–18)
AST SERPL-CCNC: 21 U/L (ref 15–37)
BASOPHILS # BLD AUTO: 0.06 X10(3) UL (ref 0–0.2)
BASOPHILS NFR BLD AUTO: 1 %
BILIRUB SERPL-MCNC: 0.4 MG/DL (ref 0.1–2)
BUN BLD-MCNC: 15 MG/DL (ref 7–18)
CALCIUM BLD-MCNC: 9 MG/DL (ref 8.5–10.1)
CHLORIDE SERPL-SCNC: 106 MMOL/L (ref 98–112)
CHOLEST SERPL-MCNC: 202 MG/DL (ref ?–200)
CO2 SERPL-SCNC: 30 MMOL/L (ref 21–32)
CREAT BLD-MCNC: 0.91 MG/DL
EOSINOPHIL # BLD AUTO: 0.21 X10(3) UL (ref 0–0.7)
EOSINOPHIL NFR BLD AUTO: 3.5 %
ERYTHROCYTE [DISTWIDTH] IN BLOOD BY AUTOMATED COUNT: 11.7 %
FASTING PATIENT LIPID ANSWER: YES
FASTING STATUS PATIENT QL REPORTED: YES
GLOBULIN PLAS-MCNC: 3.1 G/DL (ref 2.8–4.4)
GLUCOSE BLD-MCNC: 104 MG/DL (ref 70–99)
HCT VFR BLD AUTO: 44.4 %
HDLC SERPL-MCNC: 52 MG/DL (ref 40–59)
HGB BLD-MCNC: 14.8 G/DL
IMM GRANULOCYTES # BLD AUTO: 0.02 X10(3) UL (ref 0–1)
IMM GRANULOCYTES NFR BLD: 0.3 %
LDLC SERPL CALC-MCNC: 132 MG/DL (ref ?–100)
LYMPHOCYTES # BLD AUTO: 2.15 X10(3) UL (ref 1–4)
LYMPHOCYTES NFR BLD AUTO: 36.1 %
MCH RBC QN AUTO: 33.7 PG (ref 26–34)
MCHC RBC AUTO-ENTMCNC: 33.3 G/DL (ref 31–37)
MCV RBC AUTO: 101.1 FL
MONOCYTES # BLD AUTO: 0.61 X10(3) UL (ref 0.1–1)
MONOCYTES NFR BLD AUTO: 10.3 %
NEUTROPHILS # BLD AUTO: 2.9 X10 (3) UL (ref 1.5–7.7)
NEUTROPHILS # BLD AUTO: 2.9 X10(3) UL (ref 1.5–7.7)
NEUTROPHILS NFR BLD AUTO: 48.8 %
NONHDLC SERPL-MCNC: 150 MG/DL (ref ?–130)
OSMOLALITY SERPL CALC.SUM OF ELEC: 291 MOSM/KG (ref 275–295)
PLATELET # BLD AUTO: 256 10(3)UL (ref 150–450)
POTASSIUM SERPL-SCNC: 3.9 MMOL/L (ref 3.5–5.1)
PROT SERPL-MCNC: 6.8 G/DL (ref 6.4–8.2)
RBC # BLD AUTO: 4.39 X10(6)UL
SODIUM SERPL-SCNC: 140 MMOL/L (ref 136–145)
TRIGL SERPL-MCNC: 99 MG/DL (ref 30–149)
VLDLC SERPL CALC-MCNC: 18 MG/DL (ref 0–30)
WBC # BLD AUTO: 6 X10(3) UL (ref 4–11)

## 2022-06-13 PROCEDURE — 85025 COMPLETE CBC W/AUTO DIFF WBC: CPT

## 2022-06-13 PROCEDURE — 80061 LIPID PANEL: CPT

## 2022-06-13 PROCEDURE — 80053 COMPREHEN METABOLIC PANEL: CPT

## 2022-06-13 PROCEDURE — 36415 COLL VENOUS BLD VENIPUNCTURE: CPT

## 2022-06-17 ENCOUNTER — OFFICE VISIT (OUTPATIENT)
Dept: INTERNAL MEDICINE CLINIC | Facility: CLINIC | Age: 72
End: 2022-06-17
Payer: MEDICARE

## 2022-06-17 ENCOUNTER — TELEPHONE (OUTPATIENT)
Dept: INTERNAL MEDICINE CLINIC | Facility: CLINIC | Age: 72
End: 2022-06-17

## 2022-06-17 VITALS
WEIGHT: 213.19 LBS | OXYGEN SATURATION: 98 % | HEART RATE: 83 BPM | SYSTOLIC BLOOD PRESSURE: 126 MMHG | TEMPERATURE: 98 F | HEIGHT: 69 IN | DIASTOLIC BLOOD PRESSURE: 80 MMHG | RESPIRATION RATE: 14 BRPM | BODY MASS INDEX: 31.58 KG/M2

## 2022-06-17 DIAGNOSIS — Z00.00 ROUTINE GENERAL MEDICAL EXAMINATION AT A HEALTH CARE FACILITY: Primary | ICD-10-CM

## 2022-06-17 DIAGNOSIS — Z85.038 HISTORY OF COLON CANCER: ICD-10-CM

## 2022-06-17 DIAGNOSIS — M41.26 IDIOPATHIC SCOLIOSIS OF LUMBAR REGION: ICD-10-CM

## 2022-06-17 DIAGNOSIS — M62.08 DIASTASIS RECTI: ICD-10-CM

## 2022-06-17 DIAGNOSIS — F41.9 ANXIETY: ICD-10-CM

## 2022-06-17 DIAGNOSIS — K57.90 DIVERTICULOSIS: ICD-10-CM

## 2022-06-17 DIAGNOSIS — Z98.1 S/P LUMBAR FUSION: ICD-10-CM

## 2022-06-17 DIAGNOSIS — M48.061 LUMBAR FORAMINAL STENOSIS: ICD-10-CM

## 2022-06-17 DIAGNOSIS — I10 ESSENTIAL HYPERTENSION, BENIGN: ICD-10-CM

## 2022-06-17 DIAGNOSIS — E78.00 PURE HYPERCHOLESTEROLEMIA: ICD-10-CM

## 2022-06-17 PROCEDURE — 3074F SYST BP LT 130 MM HG: CPT | Performed by: INTERNAL MEDICINE

## 2022-06-17 PROCEDURE — 3008F BODY MASS INDEX DOCD: CPT | Performed by: INTERNAL MEDICINE

## 2022-06-17 PROCEDURE — 96160 PT-FOCUSED HLTH RISK ASSMT: CPT | Performed by: INTERNAL MEDICINE

## 2022-06-17 PROCEDURE — 1126F AMNT PAIN NOTED NONE PRSNT: CPT | Performed by: INTERNAL MEDICINE

## 2022-06-17 PROCEDURE — 99397 PER PM REEVAL EST PAT 65+ YR: CPT | Performed by: INTERNAL MEDICINE

## 2022-06-17 PROCEDURE — 3079F DIAST BP 80-89 MM HG: CPT | Performed by: INTERNAL MEDICINE

## 2022-06-17 PROCEDURE — G0439 PPPS, SUBSEQ VISIT: HCPCS | Performed by: INTERNAL MEDICINE

## 2022-06-17 NOTE — TELEPHONE ENCOUNTER
Future Appointments   Date Time Provider Calixto Lerma   6/2/2023 12:20 PM Latia Moncada MD [facility]     Patient is scheduled for Supervisit. Please place orders with THE [facility] OF [facility]. Patient aware to fast.  No call back required. Patient informed that labs need to be completed no sooner than 2 weeks prior to the appointment.

## 2022-06-20 ENCOUNTER — MED REC SCAN ONLY (OUTPATIENT)
Dept: INTERNAL MEDICINE CLINIC | Facility: CLINIC | Age: 72
End: 2022-06-20

## 2022-07-15 RX ORDER — PRAVASTATIN SODIUM 20 MG
TABLET ORAL
Qty: 90 TABLET | Refills: 0 | Status: SHIPPED | OUTPATIENT
Start: 2022-07-15

## 2022-09-09 RX ORDER — ESCITALOPRAM OXALATE 10 MG/1
TABLET ORAL
Qty: 90 TABLET | Refills: 1 | Status: SHIPPED | OUTPATIENT
Start: 2022-09-09

## 2022-09-09 NOTE — TELEPHONE ENCOUNTER
Last VISIT 06/17/22    Last CPE 06/17/22    Last REFILL 02/10/22 qty 90 w/1    Last LABS 06/13/22 CBC, CMP, Lipid done    Future Appointments   Date Time Provider Calixto Lerma          12/2/2022 12:40 PM Latia Moncada MD [facility]       Per PROTOCOL? Not on protocol      Please Approve or Deny.

## 2022-10-17 RX ORDER — PRAVASTATIN SODIUM 20 MG
TABLET ORAL
Qty: 90 TABLET | Refills: 0 | Status: SHIPPED | OUTPATIENT
Start: 2022-10-17

## 2022-10-18 ENCOUNTER — OFFICE VISIT (OUTPATIENT)
Dept: FAMILY MEDICINE CLINIC | Facility: CLINIC | Age: 72
End: 2022-10-18
Payer: MEDICARE

## 2022-10-18 VITALS
OXYGEN SATURATION: 97 % | BODY MASS INDEX: 28.88 KG/M2 | RESPIRATION RATE: 18 BRPM | SYSTOLIC BLOOD PRESSURE: 148 MMHG | TEMPERATURE: 98 F | WEIGHT: 195 LBS | HEART RATE: 108 BPM | HEIGHT: 69 IN | DIASTOLIC BLOOD PRESSURE: 80 MMHG

## 2022-10-18 DIAGNOSIS — B34.9 VIRAL ILLNESS: Primary | ICD-10-CM

## 2022-10-18 PROCEDURE — 3079F DIAST BP 80-89 MM HG: CPT | Performed by: NURSE PRACTITIONER

## 2022-10-18 PROCEDURE — 87637 SARSCOV2&INF A&B&RSV AMP PRB: CPT | Performed by: NURSE PRACTITIONER

## 2022-10-18 PROCEDURE — 99213 OFFICE O/P EST LOW 20 MIN: CPT | Performed by: NURSE PRACTITIONER

## 2022-10-18 PROCEDURE — 3008F BODY MASS INDEX DOCD: CPT | Performed by: NURSE PRACTITIONER

## 2022-10-18 PROCEDURE — 3077F SYST BP >= 140 MM HG: CPT | Performed by: NURSE PRACTITIONER

## 2022-10-19 LAB
FLUAV + FLUBV RNA SPEC NAA+PROBE: NEGATIVE
FLUAV + FLUBV RNA SPEC NAA+PROBE: NEGATIVE
RSV RNA SPEC NAA+PROBE: NEGATIVE
SARS-COV-2 RNA RESP QL NAA+PROBE: NOT DETECTED
SARS-COV-2 RNA RESP QL NAA+PROBE: NOT DETECTED

## 2022-10-19 RX ORDER — VALSARTAN 80 MG/1
TABLET ORAL
Qty: 90 TABLET | Refills: 0 | Status: SHIPPED | OUTPATIENT
Start: 2022-10-19

## 2022-10-19 NOTE — TELEPHONE ENCOUNTER
PASSED per protocol, refill sent.     Last PE- 1--JL     Future Appointments   Date Time Provider [facility] Maria Guadalupe   12/2/2022 12:40 PM Lisa Bella MD [facility]   3/31/2023  9:30 AM Ioana English MD [facility]   5/10/2023  8:00 AM Tatianna Hobbs MD [facility]   6/2/2023 12:20 PM Lisa Bella MD [facility]

## 2022-10-26 ENCOUNTER — OFFICE VISIT (OUTPATIENT)
Dept: FAMILY MEDICINE CLINIC | Facility: CLINIC | Age: 72
End: 2022-10-26
Payer: MEDICARE

## 2022-10-26 VITALS
HEART RATE: 85 BPM | RESPIRATION RATE: 18 BRPM | TEMPERATURE: 97 F | HEIGHT: 69 IN | WEIGHT: 195 LBS | OXYGEN SATURATION: 98 % | SYSTOLIC BLOOD PRESSURE: 154 MMHG | BODY MASS INDEX: 28.88 KG/M2 | DIASTOLIC BLOOD PRESSURE: 90 MMHG

## 2022-10-26 DIAGNOSIS — J40 BRONCHITIS: Primary | ICD-10-CM

## 2022-10-26 PROCEDURE — 99213 OFFICE O/P EST LOW 20 MIN: CPT | Performed by: FAMILY MEDICINE

## 2022-10-26 RX ORDER — PREDNISONE 20 MG/1
TABLET ORAL
Qty: 10 TABLET | Refills: 0 | Status: SHIPPED | OUTPATIENT
Start: 2022-10-26

## 2022-10-26 RX ORDER — BENZONATATE 100 MG/1
200 CAPSULE ORAL 3 TIMES DAILY PRN
Qty: 30 CAPSULE | Refills: 1 | Status: SHIPPED | OUTPATIENT
Start: 2022-10-26

## 2022-10-26 NOTE — PATIENT INSTRUCTIONS
Take prednisone-- 2 tabs once daily for 5 days. Take with food. While you are on steroids it is preferred that you take Tylenol for pain if needed rather than ibuprofen (Motrin/Ibuprofen) or Aleve. Use tessalon (benzonatate) as needed for cough-- 1-2 every 8 hours as needed. If no better in 2-3 days follow-up with your PCP for further evaluation.

## 2022-12-02 ENCOUNTER — OFFICE VISIT (OUTPATIENT)
Dept: INTERNAL MEDICINE CLINIC | Facility: CLINIC | Age: 72
End: 2022-12-02
Payer: MEDICARE

## 2022-12-02 VITALS
WEIGHT: 214 LBS | TEMPERATURE: 97 F | HEART RATE: 86 BPM | BODY MASS INDEX: 31.7 KG/M2 | OXYGEN SATURATION: 97 % | HEIGHT: 69 IN | SYSTOLIC BLOOD PRESSURE: 120 MMHG | DIASTOLIC BLOOD PRESSURE: 68 MMHG

## 2022-12-02 DIAGNOSIS — E78.00 PURE HYPERCHOLESTEROLEMIA: ICD-10-CM

## 2022-12-02 DIAGNOSIS — I10 ESSENTIAL HYPERTENSION, BENIGN: Primary | ICD-10-CM

## 2022-12-02 DIAGNOSIS — K43.9 VENTRAL HERNIA WITHOUT OBSTRUCTION OR GANGRENE: ICD-10-CM

## 2022-12-02 PROCEDURE — 3078F DIAST BP <80 MM HG: CPT | Performed by: INTERNAL MEDICINE

## 2022-12-02 PROCEDURE — 3008F BODY MASS INDEX DOCD: CPT | Performed by: INTERNAL MEDICINE

## 2022-12-02 PROCEDURE — 99214 OFFICE O/P EST MOD 30 MIN: CPT | Performed by: INTERNAL MEDICINE

## 2022-12-02 PROCEDURE — 3074F SYST BP LT 130 MM HG: CPT | Performed by: INTERNAL MEDICINE

## 2023-01-23 RX ORDER — VALSARTAN 80 MG/1
TABLET ORAL
Qty: 90 TABLET | Refills: 1 | Status: SHIPPED | OUTPATIENT
Start: 2023-01-23

## 2023-01-23 RX ORDER — PRAVASTATIN SODIUM 20 MG
TABLET ORAL
Qty: 90 TABLET | Refills: 1 | Status: SHIPPED | OUTPATIENT
Start: 2023-01-23

## 2023-01-23 NOTE — TELEPHONE ENCOUNTER
Cholesterol Medication Protocol Passed 01/21/2023 08:37 AM   Protocol Details  ALT < 80    ALT resulted within past year    Lipid panel within past 12 months    Appointment within past 12 or next 3 months        LOV 12/2/22     LAST LAB 6/12/22     LAST RX  10/17/22 90     Next OV   Future Appointments   Date Time Provider Calixto Lerma   2/1/2023  9:00 AM Isrrael Enriquez MD [facility]   5/10/2023  8:00 AM Jostin Chang MD [facility] EC Nap 4   6/2/2023 12:20 PM Sara Alvarado MD [facility]          PROTOCOL pass

## 2023-01-23 NOTE — TELEPHONE ENCOUNTER
VALSARTAN 80 MG Oral Tab 90 tablet 0 10/19/2022     Sig: TAKE 1 TABLET BY MOUTH EVERY DAY    Sent to pharmacy as: Valsartan 80 MG Oral Tablet (Diovan)    E-Prescribing Status: Receipt confirmed by pharmacy (10/19/2022 10:23 AM CDT)      Seen 12/2/22

## 2023-03-21 ENCOUNTER — MED REC SCAN ONLY (OUTPATIENT)
Dept: INTERNAL MEDICINE CLINIC | Facility: CLINIC | Age: 73
End: 2023-03-21

## 2023-04-24 ENCOUNTER — TELEPHONE (OUTPATIENT)
Dept: INTERNAL MEDICINE CLINIC | Facility: CLINIC | Age: 73
End: 2023-04-24

## 2023-05-19 ENCOUNTER — MED REC SCAN ONLY (OUTPATIENT)
Dept: INTERNAL MEDICINE CLINIC | Facility: CLINIC | Age: 73
End: 2023-05-19

## 2023-06-16 ENCOUNTER — LAB ENCOUNTER (OUTPATIENT)
Dept: LAB | Age: 73
End: 2023-06-16
Attending: INTERNAL MEDICINE
Payer: MEDICARE

## 2023-06-16 DIAGNOSIS — Z00.00 ROUTINE GENERAL MEDICAL EXAMINATION AT A HEALTH CARE FACILITY: ICD-10-CM

## 2023-06-16 DIAGNOSIS — I10 ESSENTIAL HYPERTENSION, BENIGN: ICD-10-CM

## 2023-06-16 DIAGNOSIS — E78.00 PURE HYPERCHOLESTEROLEMIA: ICD-10-CM

## 2023-06-16 DIAGNOSIS — R97.20 RISING PSA LEVEL: ICD-10-CM

## 2023-06-16 LAB
ALBUMIN SERPL-MCNC: 3.7 G/DL (ref 3.4–5)
ALBUMIN/GLOB SERPL: 1.2 {RATIO} (ref 1–2)
ALP LIVER SERPL-CCNC: 65 U/L
ALT SERPL-CCNC: 35 U/L
ANION GAP SERPL CALC-SCNC: 4 MMOL/L (ref 0–18)
AST SERPL-CCNC: 25 U/L (ref 15–37)
BASOPHILS # BLD AUTO: 0.05 X10(3) UL (ref 0–0.2)
BASOPHILS NFR BLD AUTO: 0.9 %
BILIRUB SERPL-MCNC: 0.5 MG/DL (ref 0.1–2)
BUN BLD-MCNC: 17 MG/DL (ref 7–18)
CALCIUM BLD-MCNC: 9 MG/DL (ref 8.5–10.1)
CHLORIDE SERPL-SCNC: 106 MMOL/L (ref 98–112)
CHOLEST SERPL-MCNC: 204 MG/DL (ref ?–200)
CO2 SERPL-SCNC: 29 MMOL/L (ref 21–32)
CREAT BLD-MCNC: 0.95 MG/DL
EOSINOPHIL # BLD AUTO: 0.35 X10(3) UL (ref 0–0.7)
EOSINOPHIL NFR BLD AUTO: 6 %
ERYTHROCYTE [DISTWIDTH] IN BLOOD BY AUTOMATED COUNT: 12 %
FASTING PATIENT LIPID ANSWER: YES
FASTING STATUS PATIENT QL REPORTED: YES
GFR SERPLBLD BASED ON 1.73 SQ M-ARVRAT: 86 ML/MIN/1.73M2 (ref 60–?)
GLOBULIN PLAS-MCNC: 3 G/DL (ref 2.8–4.4)
GLUCOSE BLD-MCNC: 97 MG/DL (ref 70–99)
HCT VFR BLD AUTO: 43.8 %
HDLC SERPL-MCNC: 57 MG/DL (ref 40–59)
HGB BLD-MCNC: 14.5 G/DL
IMM GRANULOCYTES # BLD AUTO: 0.02 X10(3) UL (ref 0–1)
IMM GRANULOCYTES NFR BLD: 0.3 %
LDLC SERPL CALC-MCNC: 134 MG/DL (ref ?–100)
LYMPHOCYTES # BLD AUTO: 1.78 X10(3) UL (ref 1–4)
LYMPHOCYTES NFR BLD AUTO: 30.6 %
MCH RBC QN AUTO: 32.7 PG (ref 26–34)
MCHC RBC AUTO-ENTMCNC: 33.1 G/DL (ref 31–37)
MCV RBC AUTO: 98.9 FL
MONOCYTES # BLD AUTO: 0.62 X10(3) UL (ref 0.1–1)
MONOCYTES NFR BLD AUTO: 10.7 %
NEUTROPHILS # BLD AUTO: 2.99 X10 (3) UL (ref 1.5–7.7)
NEUTROPHILS # BLD AUTO: 2.99 X10(3) UL (ref 1.5–7.7)
NEUTROPHILS NFR BLD AUTO: 51.5 %
NONHDLC SERPL-MCNC: 147 MG/DL (ref ?–130)
OSMOLALITY SERPL CALC.SUM OF ELEC: 289 MOSM/KG (ref 275–295)
PLATELET # BLD AUTO: 206 10(3)UL (ref 150–450)
POTASSIUM SERPL-SCNC: 3.9 MMOL/L (ref 3.5–5.1)
PROT SERPL-MCNC: 6.7 G/DL (ref 6.4–8.2)
PSA SERPL-MCNC: 0.62 NG/ML (ref ?–4)
RBC # BLD AUTO: 4.43 X10(6)UL
SODIUM SERPL-SCNC: 139 MMOL/L (ref 136–145)
TRIGL SERPL-MCNC: 73 MG/DL (ref 30–149)
VLDLC SERPL CALC-MCNC: 13 MG/DL (ref 0–30)
WBC # BLD AUTO: 5.8 X10(3) UL (ref 4–11)

## 2023-06-16 PROCEDURE — 85025 COMPLETE CBC W/AUTO DIFF WBC: CPT

## 2023-06-16 PROCEDURE — 80053 COMPREHEN METABOLIC PANEL: CPT

## 2023-06-16 PROCEDURE — 36415 COLL VENOUS BLD VENIPUNCTURE: CPT

## 2023-06-16 PROCEDURE — 80061 LIPID PANEL: CPT

## 2023-06-16 PROCEDURE — 84153 ASSAY OF PSA TOTAL: CPT

## 2023-06-20 ENCOUNTER — OFFICE VISIT (OUTPATIENT)
Dept: INTERNAL MEDICINE CLINIC | Facility: CLINIC | Age: 73
End: 2023-06-20
Payer: MEDICARE

## 2023-06-20 DIAGNOSIS — Z85.038 HISTORY OF COLON CANCER: ICD-10-CM

## 2023-06-20 DIAGNOSIS — F41.9 ANXIETY: ICD-10-CM

## 2023-06-20 DIAGNOSIS — K57.90 DIVERTICULOSIS: ICD-10-CM

## 2023-06-20 DIAGNOSIS — M54.16 LUMBAR RADICULOPATHY: ICD-10-CM

## 2023-06-20 DIAGNOSIS — R39.12 BENIGN PROSTATIC HYPERPLASIA WITH WEAK URINARY STREAM: ICD-10-CM

## 2023-06-20 DIAGNOSIS — M62.08 DIASTASIS RECTI: ICD-10-CM

## 2023-06-20 DIAGNOSIS — E78.00 PURE HYPERCHOLESTEROLEMIA: ICD-10-CM

## 2023-06-20 DIAGNOSIS — N40.1 BENIGN PROSTATIC HYPERPLASIA WITH WEAK URINARY STREAM: ICD-10-CM

## 2023-06-20 DIAGNOSIS — Z98.1 S/P LUMBAR FUSION: ICD-10-CM

## 2023-06-20 DIAGNOSIS — M48.061 LUMBAR FORAMINAL STENOSIS: ICD-10-CM

## 2023-06-20 DIAGNOSIS — M41.26 IDIOPATHIC SCOLIOSIS OF LUMBAR REGION: ICD-10-CM

## 2023-06-20 DIAGNOSIS — I10 ESSENTIAL HYPERTENSION, BENIGN: ICD-10-CM

## 2023-06-20 DIAGNOSIS — E66.9 OBESITY (BMI 30-39.9): ICD-10-CM

## 2023-06-20 DIAGNOSIS — Z00.00 ENCOUNTER FOR ANNUAL HEALTH EXAMINATION: Primary | ICD-10-CM

## 2023-06-20 PROCEDURE — 1125F AMNT PAIN NOTED PAIN PRSNT: CPT | Performed by: FAMILY MEDICINE

## 2023-06-20 PROCEDURE — 1170F FXNL STATUS ASSESSED: CPT | Performed by: FAMILY MEDICINE

## 2023-06-20 PROCEDURE — G0439 PPPS, SUBSEQ VISIT: HCPCS | Performed by: FAMILY MEDICINE

## 2023-06-20 PROCEDURE — 1160F RVW MEDS BY RX/DR IN RCRD: CPT | Performed by: FAMILY MEDICINE

## 2023-06-20 PROCEDURE — 1159F MED LIST DOCD IN RCRD: CPT | Performed by: FAMILY MEDICINE

## 2023-06-20 PROCEDURE — 99214 OFFICE O/P EST MOD 30 MIN: CPT | Performed by: FAMILY MEDICINE

## 2023-06-20 PROCEDURE — 96160 PT-FOCUSED HLTH RISK ASSMT: CPT | Performed by: FAMILY MEDICINE

## 2023-06-20 RX ORDER — PRAVASTATIN SODIUM 40 MG
40 TABLET ORAL NIGHTLY
Qty: 90 TABLET | Refills: 3 | Status: SHIPPED | OUTPATIENT
Start: 2023-06-20

## 2023-06-23 ENCOUNTER — TELEPHONE (OUTPATIENT)
Dept: PHYSICAL THERAPY | Facility: HOSPITAL | Age: 73
End: 2023-06-23

## 2023-06-27 ENCOUNTER — PATIENT MESSAGE (OUTPATIENT)
Dept: PHYSICAL THERAPY | Age: 73
End: 2023-06-27

## 2023-06-27 ENCOUNTER — OFFICE VISIT (OUTPATIENT)
Dept: PHYSICAL THERAPY | Age: 73
End: 2023-06-27
Attending: FAMILY MEDICINE
Payer: MEDICARE

## 2023-06-27 DIAGNOSIS — M41.26 IDIOPATHIC SCOLIOSIS OF LUMBAR REGION: ICD-10-CM

## 2023-06-27 DIAGNOSIS — M54.16 LUMBAR RADICULOPATHY: ICD-10-CM

## 2023-06-27 DIAGNOSIS — M48.061 LUMBAR FORAMINAL STENOSIS: Primary | ICD-10-CM

## 2023-06-27 DIAGNOSIS — Z98.1 S/P LUMBAR FUSION: ICD-10-CM

## 2023-06-27 PROCEDURE — 97163 PT EVAL HIGH COMPLEX 45 MIN: CPT

## 2023-06-27 PROCEDURE — 97110 THERAPEUTIC EXERCISES: CPT

## 2023-06-29 ENCOUNTER — OFFICE VISIT (OUTPATIENT)
Dept: PHYSICAL THERAPY | Age: 73
End: 2023-06-29
Attending: FAMILY MEDICINE
Payer: MEDICARE

## 2023-06-29 PROCEDURE — 97110 THERAPEUTIC EXERCISES: CPT

## 2023-07-11 ENCOUNTER — OFFICE VISIT (OUTPATIENT)
Dept: PHYSICAL THERAPY | Age: 73
End: 2023-07-11
Attending: FAMILY MEDICINE
Payer: MEDICARE

## 2023-07-11 PROCEDURE — 97110 THERAPEUTIC EXERCISES: CPT

## 2023-07-11 PROCEDURE — 97112 NEUROMUSCULAR REEDUCATION: CPT

## 2023-07-13 ENCOUNTER — OFFICE VISIT (OUTPATIENT)
Dept: PHYSICAL THERAPY | Age: 73
End: 2023-07-13
Attending: FAMILY MEDICINE
Payer: MEDICARE

## 2023-07-13 PROCEDURE — 97112 NEUROMUSCULAR REEDUCATION: CPT

## 2023-07-18 ENCOUNTER — APPOINTMENT (OUTPATIENT)
Dept: PHYSICAL THERAPY | Age: 73
End: 2023-07-18
Attending: FAMILY MEDICINE
Payer: MEDICARE

## 2023-07-25 ENCOUNTER — OFFICE VISIT (OUTPATIENT)
Dept: PHYSICAL THERAPY | Age: 73
End: 2023-07-25
Attending: FAMILY MEDICINE
Payer: MEDICARE

## 2023-07-25 PROCEDURE — 97110 THERAPEUTIC EXERCISES: CPT

## 2023-07-25 PROCEDURE — 97112 NEUROMUSCULAR REEDUCATION: CPT

## 2023-07-27 ENCOUNTER — OFFICE VISIT (OUTPATIENT)
Dept: PHYSICAL THERAPY | Age: 73
End: 2023-07-27
Attending: FAMILY MEDICINE
Payer: MEDICARE

## 2023-07-27 PROCEDURE — 97110 THERAPEUTIC EXERCISES: CPT

## 2023-08-01 ENCOUNTER — APPOINTMENT (OUTPATIENT)
Dept: PHYSICAL THERAPY | Age: 73
End: 2023-08-01
Attending: FAMILY MEDICINE
Payer: MEDICARE

## 2023-08-03 ENCOUNTER — TELEPHONE (OUTPATIENT)
Dept: PHYSICAL THERAPY | Facility: HOSPITAL | Age: 73
End: 2023-08-03

## 2023-08-03 RX ORDER — ESCITALOPRAM OXALATE 10 MG/1
10 TABLET ORAL DAILY
Qty: 90 TABLET | Refills: 1 | Status: SHIPPED | OUTPATIENT
Start: 2023-08-03

## 2023-08-03 RX ORDER — PRAVASTATIN SODIUM 20 MG
TABLET ORAL
Qty: 90 TABLET | Refills: 1 | OUTPATIENT
Start: 2023-08-03

## 2023-08-03 NOTE — TELEPHONE ENCOUNTER
Rx denied- Dosage change    The original prescription was discontinued on 6/20/2023 by Jeferson Dawn MD for the following reason: Dose adjustment. Renewing this prescription may not be appropriate. Medication Quantity Refills Start End   pravastatin 40 MG Oral Tab 90 tablet 3 6/20/2023    Sig:   Take 1 tablet (40 mg total) by mouth nightly.

## 2023-08-03 NOTE — TELEPHONE ENCOUNTER
Requested Prescriptions     Pending Prescriptions Disp Refills    ESCITALOPRAM 10 MG Oral Tab [Pharmacy Med Name: ESCITALOPRAM 10 MG TABLET] 90 tablet 1     Sig: TAKE 1 TABLET(10 MG) BY MOUTH EVERY DAY       LOV: 6--mk-physical    LAST CPE: 6--mk-physical    Last Labs: 6--cbc,cmp,lipid    Last Refill: 4--- 90 tabs with 1 refills     Your appointments       Date & Time Appointment Department [facility])    Aug 17, 2023 11:00 AM CDT Physical Therapy Ortho Treatment with Francia Sanderson, PT [facility] in [place] ([address])        Oct 20, 2023  1:30 PM CDT Established Patient with Jyoti Caldwell MD [address], [address] ([address])              [facility] in [address] [address]  [phone] [facility], [address]  [id number]

## 2023-08-15 ENCOUNTER — TELEPHONE (OUTPATIENT)
Dept: INTERNAL MEDICINE CLINIC | Facility: CLINIC | Age: 73
End: 2023-08-15

## 2023-08-15 NOTE — TELEPHONE ENCOUNTER
Received eye exam from [facility]. Abstracted.  Placed on [facility] NORTH desk for review

## 2023-08-17 ENCOUNTER — OFFICE VISIT (OUTPATIENT)
Dept: PHYSICAL THERAPY | Age: 73
End: 2023-08-17
Attending: FAMILY MEDICINE
Payer: MEDICARE

## 2023-08-17 PROCEDURE — 97140 MANUAL THERAPY 1/> REGIONS: CPT

## 2023-08-17 PROCEDURE — 97110 THERAPEUTIC EXERCISES: CPT

## 2023-10-11 ENCOUNTER — TELEPHONE (OUTPATIENT)
Dept: INTERNAL MEDICINE CLINIC | Facility: CLINIC | Age: 73
End: 2023-10-11

## 2023-10-11 NOTE — TELEPHONE ENCOUNTER
Received eye exam from [facility].  Placed on [facility] NORTH desk for review

## 2023-10-27 RX ORDER — VALSARTAN 80 MG/1
TABLET ORAL
Qty: 90 TABLET | Refills: 0 | Status: SHIPPED | OUTPATIENT
Start: 2023-10-27

## 2023-10-27 NOTE — TELEPHONE ENCOUNTER
PASSED per protocol, refill sent.   Last PE 6/20/23  Future Appointments   Date Time Provider Calixto Lerma   1/22/2024  9:00 AM Cortney Dotson MD [facility]

## 2023-11-28 ENCOUNTER — OFFICE VISIT (OUTPATIENT)
Dept: FAMILY MEDICINE CLINIC | Facility: CLINIC | Age: 73
End: 2023-11-28
Payer: MEDICARE

## 2023-11-28 VITALS
RESPIRATION RATE: 18 BRPM | DIASTOLIC BLOOD PRESSURE: 77 MMHG | BODY MASS INDEX: 28.88 KG/M2 | WEIGHT: 195 LBS | SYSTOLIC BLOOD PRESSURE: 141 MMHG | OXYGEN SATURATION: 97 % | HEIGHT: 69 IN | TEMPERATURE: 98 F | HEART RATE: 91 BPM

## 2023-11-28 DIAGNOSIS — H61.21 EXCESSIVE CERUMEN IN EAR CANAL, RIGHT: Primary | ICD-10-CM

## 2023-11-28 PROCEDURE — 3078F DIAST BP <80 MM HG: CPT | Performed by: NURSE PRACTITIONER

## 2023-11-28 PROCEDURE — 1160F RVW MEDS BY RX/DR IN RCRD: CPT | Performed by: NURSE PRACTITIONER

## 2023-11-28 PROCEDURE — 1159F MED LIST DOCD IN RCRD: CPT | Performed by: NURSE PRACTITIONER

## 2023-11-28 PROCEDURE — 99213 OFFICE O/P EST LOW 20 MIN: CPT | Performed by: NURSE PRACTITIONER

## 2023-11-28 PROCEDURE — 3008F BODY MASS INDEX DOCD: CPT | Performed by: NURSE PRACTITIONER

## 2023-11-28 PROCEDURE — 3077F SYST BP >= 140 MM HG: CPT | Performed by: NURSE PRACTITIONER

## 2023-12-01 ENCOUNTER — TELEPHONE (OUTPATIENT)
Dept: INTERNAL MEDICINE CLINIC | Facility: CLINIC | Age: 73
End: 2023-12-01

## 2023-12-01 NOTE — TELEPHONE ENCOUNTER
Called and spoke to pt. Advised to continue tylenol and f/u next week if not improving. Pt stated understanding and agreed to plan. He would still like to scheduled with MK as the pain when getting up out of a chair has been going on for a few weeks. Offered MK next available 12/7, pt agreeable and thankful.

## 2023-12-01 NOTE — TELEPHONE ENCOUNTER
Called and spoke to pt. He has noticed pain in his R groin area for about a week, but worsened 2 days ago. Denies bulging/bumps in the area. 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 More painful with activity. Last night pt was having difficulty walking due to pain, but after sleeping and taking tylenol, it is improved today.     MK, what would you recommend for pt? Rest over weekend and monitor for improvement/call next week with update to determine if appt still needed?

## 2023-12-01 NOTE — TELEPHONE ENCOUNTER
Patient calling with pain in his right groin x2 days does not recall an injury, very painful. No openings.

## 2023-12-25 ENCOUNTER — APPOINTMENT (OUTPATIENT)
Dept: GENERAL RADIOLOGY | Age: 73
End: 2023-12-25
Attending: EMERGENCY MEDICINE
Payer: MEDICARE

## 2023-12-25 ENCOUNTER — HOSPITAL ENCOUNTER (EMERGENCY)
Age: 73
Discharge: HOME | End: 2023-12-25
Attending: EMERGENCY MEDICINE
Payer: MEDICARE

## 2023-12-25 VITALS
HEIGHT: 69 IN | HEART RATE: 89 BPM | DIASTOLIC BLOOD PRESSURE: 85 MMHG | OXYGEN SATURATION: 97 % | RESPIRATION RATE: 16 BRPM | BODY MASS INDEX: 29.62 KG/M2 | SYSTOLIC BLOOD PRESSURE: 127 MMHG | TEMPERATURE: 99 F | WEIGHT: 200 LBS

## 2023-12-25 DIAGNOSIS — R07.89 CHEST PAIN, ATYPICAL: Primary | ICD-10-CM

## 2023-12-25 LAB
ALBUMIN SERPL-MCNC: 3.8 G/DL (ref 3.4–5)
ALBUMIN/GLOB SERPL: 1.1 {RATIO} (ref 1–2)
ALP LIVER SERPL-CCNC: 68 U/L
ALT SERPL-CCNC: 38 U/L
ANION GAP SERPL CALC-SCNC: 3 MMOL/L (ref 0–18)
AST SERPL-CCNC: 29 U/L (ref 15–37)
BASOPHILS # BLD AUTO: 0.05 X10(3) UL (ref 0–0.2)
BASOPHILS NFR BLD AUTO: 0.7 %
BILIRUB SERPL-MCNC: 0.5 MG/DL (ref 0.1–2)
BUN BLD-MCNC: 15 MG/DL (ref 9–23)
CALCIUM BLD-MCNC: 9 MG/DL (ref 8.5–10.1)
CHLORIDE SERPL-SCNC: 106 MMOL/L (ref 98–112)
CO2 SERPL-SCNC: 27 MMOL/L (ref 21–32)
CREAT BLD-MCNC: 1.14 MG/DL
D DIMER PPP FEU-MCNC: 0.27 UG/ML FEU (ref ?–0.71)
EGFRCR SERPLBLD CKD-EPI 2021: 69 ML/MIN/1.73M2 (ref 60–?)
EOSINOPHIL # BLD AUTO: 0.34 X10(3) UL (ref 0–0.7)
EOSINOPHIL NFR BLD AUTO: 4.6 %
ERYTHROCYTE [DISTWIDTH] IN BLOOD BY AUTOMATED COUNT: 12.1 %
GLOBULIN PLAS-MCNC: 3.4 G/DL (ref 2.8–4.4)
GLUCOSE BLD-MCNC: 120 MG/DL (ref 70–99)
HCT VFR BLD AUTO: 44.5 %
HGB BLD-MCNC: 15.6 G/DL
IMM GRANULOCYTES # BLD AUTO: 0.02 X10(3) UL (ref 0–1)
IMM GRANULOCYTES NFR BLD: 0.3 %
LYMPHOCYTES # BLD AUTO: 2.35 X10(3) UL (ref 1–4)
LYMPHOCYTES NFR BLD AUTO: 32 %
MCH RBC QN AUTO: 33.5 PG (ref 26–34)
MCHC RBC AUTO-ENTMCNC: 35.1 G/DL (ref 31–37)
MCV RBC AUTO: 95.5 FL
MONOCYTES # BLD AUTO: 0.64 X10(3) UL (ref 0.1–1)
MONOCYTES NFR BLD AUTO: 8.7 %
NEUTROPHILS # BLD AUTO: 3.95 X10 (3) UL (ref 1.5–7.7)
NEUTROPHILS # BLD AUTO: 3.95 X10(3) UL (ref 1.5–7.7)
NEUTROPHILS NFR BLD AUTO: 53.7 %
OSMOLALITY SERPL CALC.SUM OF ELEC: 284 MOSM/KG (ref 275–295)
PLATELET # BLD AUTO: 242 10(3)UL (ref 150–450)
POTASSIUM SERPL-SCNC: 4.3 MMOL/L (ref 3.5–5.1)
PROT SERPL-MCNC: 7.2 G/DL (ref 6.4–8.2)
RBC # BLD AUTO: 4.66 X10(6)UL
SODIUM SERPL-SCNC: 136 MMOL/L (ref 136–145)
TROPONIN I SERPL HS-MCNC: 9 NG/L
WBC # BLD AUTO: 7.4 X10(3) UL (ref 4–11)

## 2023-12-25 PROCEDURE — 71045 X-RAY EXAM CHEST 1 VIEW: CPT | Performed by: EMERGENCY MEDICINE

## 2023-12-25 PROCEDURE — 85379 FIBRIN DEGRADATION QUANT: CPT | Performed by: EMERGENCY MEDICINE

## 2023-12-25 PROCEDURE — 99284 EMERGENCY DEPT VISIT MOD MDM: CPT

## 2023-12-25 PROCEDURE — 93010 ELECTROCARDIOGRAM REPORT: CPT

## 2023-12-25 PROCEDURE — 99285 EMERGENCY DEPT VISIT HI MDM: CPT

## 2023-12-25 PROCEDURE — 84484 ASSAY OF TROPONIN QUANT: CPT | Performed by: EMERGENCY MEDICINE

## 2023-12-25 PROCEDURE — 80053 COMPREHEN METABOLIC PANEL: CPT

## 2023-12-25 PROCEDURE — 80053 COMPREHEN METABOLIC PANEL: CPT | Performed by: EMERGENCY MEDICINE

## 2023-12-25 PROCEDURE — 85025 COMPLETE CBC W/AUTO DIFF WBC: CPT

## 2023-12-25 PROCEDURE — 85025 COMPLETE CBC W/AUTO DIFF WBC: CPT | Performed by: EMERGENCY MEDICINE

## 2023-12-25 PROCEDURE — 84484 ASSAY OF TROPONIN QUANT: CPT

## 2023-12-25 PROCEDURE — 36415 COLL VENOUS BLD VENIPUNCTURE: CPT

## 2023-12-25 PROCEDURE — 93005 ELECTROCARDIOGRAM TRACING: CPT

## 2023-12-26 LAB
ATRIAL RATE: 108 BPM
P AXIS: 30 DEGREES
P-R INTERVAL: 158 MS
Q-T INTERVAL: 354 MS
QRS DURATION: 126 MS
QTC CALCULATION (BEZET): 474 MS
R AXIS: -70 DEGREES
T AXIS: 8 DEGREES
VENTRICULAR RATE: 108 BPM

## 2023-12-30 ENCOUNTER — OFFICE VISIT (OUTPATIENT)
Dept: FAMILY MEDICINE CLINIC | Facility: CLINIC | Age: 73
End: 2023-12-30
Payer: MEDICARE

## 2023-12-30 VITALS
TEMPERATURE: 98 F | HEART RATE: 86 BPM | DIASTOLIC BLOOD PRESSURE: 82 MMHG | HEIGHT: 69 IN | WEIGHT: 200 LBS | SYSTOLIC BLOOD PRESSURE: 130 MMHG | BODY MASS INDEX: 29.62 KG/M2 | OXYGEN SATURATION: 98 % | RESPIRATION RATE: 16 BRPM

## 2023-12-30 DIAGNOSIS — J06.9 VIRAL URI WITH COUGH: Primary | ICD-10-CM

## 2023-12-30 PROCEDURE — 3008F BODY MASS INDEX DOCD: CPT | Performed by: PHYSICIAN ASSISTANT

## 2023-12-30 PROCEDURE — 87637 SARSCOV2&INF A&B&RSV AMP PRB: CPT | Performed by: PHYSICIAN ASSISTANT

## 2023-12-30 PROCEDURE — 1159F MED LIST DOCD IN RCRD: CPT | Performed by: PHYSICIAN ASSISTANT

## 2023-12-30 PROCEDURE — 99213 OFFICE O/P EST LOW 20 MIN: CPT | Performed by: PHYSICIAN ASSISTANT

## 2023-12-30 PROCEDURE — 3075F SYST BP GE 130 - 139MM HG: CPT | Performed by: PHYSICIAN ASSISTANT

## 2023-12-30 PROCEDURE — 3079F DIAST BP 80-89 MM HG: CPT | Performed by: PHYSICIAN ASSISTANT

## 2023-12-30 RX ORDER — ALBUTEROL SULFATE 90 UG/1
2 AEROSOL, METERED RESPIRATORY (INHALATION) EVERY 6 HOURS PRN
Qty: 18 G | Refills: 0 | Status: SHIPPED | OUTPATIENT
Start: 2023-12-30

## 2023-12-30 RX ORDER — BENZONATATE 200 MG/1
200 CAPSULE ORAL 3 TIMES DAILY PRN
Qty: 20 CAPSULE | Refills: 0 | Status: SHIPPED | OUTPATIENT
Start: 2023-12-30

## 2023-12-30 RX ORDER — METHYLPREDNISOLONE 4 MG/1
TABLET ORAL
Qty: 21 EACH | Refills: 0 | Status: SHIPPED | OUTPATIENT
Start: 2023-12-30

## 2023-12-31 LAB
FLUAV + FLUBV RNA SPEC NAA+PROBE: NOT DETECTED
FLUAV + FLUBV RNA SPEC NAA+PROBE: NOT DETECTED
RSV RNA SPEC NAA+PROBE: NOT DETECTED
SARS-COV-2 RNA RESP QL NAA+PROBE: NOT DETECTED

## 2024-01-03 ENCOUNTER — TELEPHONE (OUTPATIENT)
Dept: INTERNAL MEDICINE CLINIC | Facility: CLINIC | Age: 74
End: 2024-01-03

## 2024-01-03 NOTE — TELEPHONE ENCOUNTER
Pt went to [facility] and had EKG he was having chest pains-EKG a little abnormal-please have MK review EKG And call pt to disucss

## 2024-01-03 NOTE — TELEPHONE ENCOUNTER
FYI    Spoke to patient. Scheduled ED f/u. Was told his EKG wasn't completely normal, but similar to his previous record. No active cp and no symptoms since ED visit. Sched per below. ED warnings given.     Future Appointments   Date Time Provider Department Center   1/8/2024  2:30 PM Garrison Li MD [facility]   1/22/2024  9:00 AM Twan Padilla MD [facility]

## 2024-01-08 ENCOUNTER — OFFICE VISIT (OUTPATIENT)
Dept: INTERNAL MEDICINE CLINIC | Facility: CLINIC | Age: 74
End: 2024-01-08
Payer: MEDICARE

## 2024-01-08 VITALS
HEART RATE: 92 BPM | TEMPERATURE: 98 F | RESPIRATION RATE: 16 BRPM | WEIGHT: 217 LBS | BODY MASS INDEX: 32.89 KG/M2 | HEIGHT: 68 IN | SYSTOLIC BLOOD PRESSURE: 120 MMHG | OXYGEN SATURATION: 93 % | DIASTOLIC BLOOD PRESSURE: 68 MMHG

## 2024-01-08 DIAGNOSIS — I10 ESSENTIAL HYPERTENSION, BENIGN: ICD-10-CM

## 2024-01-08 DIAGNOSIS — E78.00 PURE HYPERCHOLESTEROLEMIA: ICD-10-CM

## 2024-01-08 DIAGNOSIS — R06.00 DYSPNEA, UNSPECIFIED TYPE: ICD-10-CM

## 2024-01-08 DIAGNOSIS — R07.9 CHEST PAIN, UNSPECIFIED TYPE: Primary | ICD-10-CM

## 2024-01-08 PROCEDURE — 1170F FXNL STATUS ASSESSED: CPT | Performed by: FAMILY MEDICINE

## 2024-01-08 PROCEDURE — 3008F BODY MASS INDEX DOCD: CPT | Performed by: FAMILY MEDICINE

## 2024-01-08 PROCEDURE — 1160F RVW MEDS BY RX/DR IN RCRD: CPT | Performed by: FAMILY MEDICINE

## 2024-01-08 PROCEDURE — 3074F SYST BP LT 130 MM HG: CPT | Performed by: FAMILY MEDICINE

## 2024-01-08 PROCEDURE — 99214 OFFICE O/P EST MOD 30 MIN: CPT | Performed by: FAMILY MEDICINE

## 2024-01-08 PROCEDURE — 1159F MED LIST DOCD IN RCRD: CPT | Performed by: FAMILY MEDICINE

## 2024-01-08 PROCEDURE — 3078F DIAST BP <80 MM HG: CPT | Performed by: FAMILY MEDICINE

## 2024-01-08 NOTE — PROGRESS NOTES
Eloy Sanchez  3/1/1950    Chief Complaint   Patient presents with    Follow - Up     NT RM 9 F/U for chest pain and EKG done in ER on 12/25/2023. No longer having chest pains.       HPI:   Eloy Sanchez is a 73 year old male who presents for ER follow-up. He was seen in the ED on [place] day for chest pain. The pain was an ache int he left side of the chest with radiation to the shoulder. The symptoms overall resolved on they're own that day. IN the ER he did have evaluation in EKG, blood work and CXR. His EKG was overall stable, and his troponin and dimer were negative. He has not had any return of symptoms but did develop a respiratory infection shortly after with cough and chest congestion that has now with oral steroid course and cough suppressants. His viral panel was negative. He has had mild dyspnea that he has noted at work after walking a flight a stairs that has been present for a few months. He didn't think much of it until his recent ED visit. He denies associated exertional chest pain prior.     Current Outpatient Medications   Medication Sig Dispense Refill    albuterol 108 (90 Base) MCG/ACT Inhalation Aero Soln Inhale 2 puffs into the lungs every 6 (six) hours as needed for Wheezing. 18 g 0    methylPREDNISolone (MEDROL) 4 MG Oral Tablet Therapy Pack As directed. 21 each 0    benzonatate 200 MG Oral Cap Take 1 capsule (200 mg total) by mouth 3 (three) times daily as needed. 20 capsule 0    VALSARTAN 80 MG Oral Tab TAKE 1 TABLET BY MOUTH EVERY DAY 90 tablet 0    escitalopram 10 MG Oral Tab Take 1 tablet (10 mg total) by mouth daily. 90 tablet 1    pravastatin 40 MG Oral Tab Take 1 tablet (40 mg total) by mouth nightly. 90 tablet 3    Multiple Vitamins-Minerals (PRESERVISION AREDS) Oral Tab Take 2 tablets by mouth daily. (Patient not taking: Reported on 11/28/2023)      Multiple Vitamins-Minerals (MULTI-VITAMIN/MINERALS) Oral Tab Take 1 tablet by mouth daily. (Patient not taking: Reported on  11/28/2023)        Allergies   Allergen Reactions    Lipitor [Atorvastatin Calcium] MYALGIA      Past Medical History:   Diagnosis Date    Actinic keratosis     Allergic rhinitis, cause unspecified 6/29/2012    ANXIETY     Anxiety 5/20/2016    Anxiety state, unspecified     BACK PAIN     Basal cell carcinoma     Depression     Donor of unspecified organ or tissue 6/29/2012    Encounter for long-term (current) use of other medications 6/29/2012    External hemorrhoids without mention of complication 6/29/2012    Foot and toe(s), superficial foreign body (splinter), without major open wound and without mention of infection     High blood pressure     High cholesterol     HYPERTENSION     HYPERTRIGLYCERIDEMIA     Lumbar scoliosis 5/23/2014    Melanoma ([facility]CC)     2007, resected    Melanoma of skin, site unspecified 6/29/2012    Psychosexual dysfunction with inhibited sexual excitement     Sciatica 6/29/2012    Sinusitis 12/11/2013    Spinal stenosis, lumbar region, without neurogenic claudication 6/17/2009    Spondylolisthesis of lumbar region 5/31/2016    Squamous cell carcinoma     Unspecified essential hypertension     Visual impairment     glasses      Patient Active Problem List   Diagnosis    Diverticulosis    Pure hypercholesterolemia    Essential hypertension, benign    Lumbar foraminal stenosis    History of colon cancer, in his father age 72    Anxiety    S/P lumbar fusion    Idiopathic scoliosis of lumbar region    Diastasis recti      Past Surgical History:   Procedure Laterality Date    BACK SURGERY  5/31/16    L5-S1 ALIF    COLONOSCOPY  12/2002 4/18/2006    exstensive diverticulosis coli  Dr Nate Hernandez    COLONOSCOPY  6/23/15    Dr.David Hernandez rpt in 3 years     COLONOSCOPY  11/06/2018    Dr. Nate Hernandez repeat in 3 yrs    OTHER SURGICAL HISTORY      resection to remove melanoma    TONSILLECTOMY        Family History   Problem Relation Age of Onset    Breast Cancer Sister     Cancer Father         colon     Cancer Mother     Heart Attack Mother       Social History     Socioeconomic History    Marital status:    Tobacco Use    Smoking status: Former     Packs/day: 0.50     Years: 17.00     Additional pack years: 0.00     Total pack years: 8.50     Types: Cigarettes     Quit date: 1987     Years since quittin.0    Smokeless tobacco: Never   Vaping Use    Vaping Use: Never used   Substance and Sexual Activity    Alcohol use: Yes     Comment: daily--2 drinks    Drug use: No    Sexual activity: Not Currently   Other Topics Concern    Caffeine Concern Yes    Exercise Yes         REVIEW OF SYSTEMS:   GENERAL: no fever, chills. Chest pain and dyspnea as in HPI. No abdominal pain, n/v/c/d.     EXAM:   /68   Pulse 92   Temp 97.8 °F (36.6 °C) (Temporal)   Resp 16   Ht 5' 8\" (1.727 m)   Wt 217 lb (98.4 kg)   SpO2 93%   BMI 32.99 kg/m²   GENERAL: Well developed, well nourished,in no apparent distress  SKIN: No rash  EYES: PERRLA, EOMI, conjunctiva are clear  HEENT: atraumatic, normocephalic,ears and throat are clear  NECK: supple,no adenopathy  LUNGS: clear to auscultation  CARDIO: RRR without murmur  GI: good BS's,no masses, HSM or tenderness    ASSESSMENT AND PLAN:   Eloy Sanchez is a 73 year old male who presents for ER follow-up    Chest pain  Dyspnea, unspecified type  Reviewed his ER evaluation including EKG that showed no ischemic changes, negative troponin CXR, and dimer. He has not had any true anginal chest pain but does endorse mild exertional dyspnea thus, we will further evaluate with stress echo given his risk factors.   - CARD ECHO STRESS ECHO/REST AND STRESS(CPT=93350/67125 DMG [id number]); Future    Pure hypercholesterolemia  Continue statin    Essential hypertension, benign  BP at goal.     All questions were answered and the patient agrees with the plan.     Thank you,  Garrison Li MD

## 2024-01-18 ENCOUNTER — TELEPHONE (OUTPATIENT)
Dept: INTERNAL MEDICINE CLINIC | Facility: CLINIC | Age: 74
End: 2024-01-18

## 2024-01-18 NOTE — TELEPHONE ENCOUNTER
Pt is inquiring about possibly getting a prescription for viagra. He has not been on this medication beforehand this is a new health concern for him.

## 2024-01-18 NOTE — TELEPHONE ENCOUNTER
Future Appointments   Date Provider Department Center   3/11/2024 Twan Padilla MD [facility]

## 2024-01-19 ENCOUNTER — HOSPITAL ENCOUNTER (OUTPATIENT)
Dept: CV DIAGNOSTICS | Facility: HOSPITAL | Age: 74
Discharge: HOME | End: 2024-01-19
Attending: FAMILY MEDICINE
Payer: MEDICARE

## 2024-01-19 ENCOUNTER — OFFICE VISIT (OUTPATIENT)
Dept: INTERNAL MEDICINE CLINIC | Facility: CLINIC | Age: 74
End: 2024-01-19
Payer: MEDICARE

## 2024-01-19 ENCOUNTER — MED REC SCAN ONLY (OUTPATIENT)
Dept: INTERNAL MEDICINE CLINIC | Facility: CLINIC | Age: 74
End: 2024-01-19

## 2024-01-19 VITALS
WEIGHT: 217 LBS | TEMPERATURE: 97 F | OXYGEN SATURATION: 98 % | HEIGHT: 68 IN | DIASTOLIC BLOOD PRESSURE: 66 MMHG | SYSTOLIC BLOOD PRESSURE: 122 MMHG | BODY MASS INDEX: 32.89 KG/M2 | RESPIRATION RATE: 16 BRPM | HEART RATE: 90 BPM

## 2024-01-19 DIAGNOSIS — R07.9 CHEST PAIN, UNSPECIFIED TYPE: ICD-10-CM

## 2024-01-19 DIAGNOSIS — N52.9 ERECTILE DYSFUNCTION, UNSPECIFIED ERECTILE DYSFUNCTION TYPE: Primary | ICD-10-CM

## 2024-01-19 DIAGNOSIS — R06.00 DYSPNEA, UNSPECIFIED TYPE: ICD-10-CM

## 2024-01-19 PROCEDURE — 1160F RVW MEDS BY RX/DR IN RCRD: CPT | Performed by: FAMILY MEDICINE

## 2024-01-19 PROCEDURE — 3008F BODY MASS INDEX DOCD: CPT | Performed by: FAMILY MEDICINE

## 2024-01-19 PROCEDURE — 93017 CV STRESS TEST TRACING ONLY: CPT | Performed by: FAMILY MEDICINE

## 2024-01-19 PROCEDURE — 1159F MED LIST DOCD IN RCRD: CPT | Performed by: FAMILY MEDICINE

## 2024-01-19 PROCEDURE — 93350 STRESS TTE ONLY: CPT | Performed by: FAMILY MEDICINE

## 2024-01-19 PROCEDURE — 93018 CV STRESS TEST I&R ONLY: CPT | Performed by: FAMILY MEDICINE

## 2024-01-19 PROCEDURE — 3078F DIAST BP <80 MM HG: CPT | Performed by: FAMILY MEDICINE

## 2024-01-19 PROCEDURE — 99214 OFFICE O/P EST MOD 30 MIN: CPT | Performed by: FAMILY MEDICINE

## 2024-01-19 PROCEDURE — 3074F SYST BP LT 130 MM HG: CPT | Performed by: FAMILY MEDICINE

## 2024-01-19 NOTE — PROGRESS NOTES
Eloy Sanchez  3/1/1950    Chief Complaint   Patient presents with    Medication Request     EJ Rm 2- Pt want to discuss medication for ED       HPI:   Eloy Sanchez is a 73 year old male who presents for evaluation of ED. He has had 3 recent experiences were he was not able to maintain an erection. Has not had difficulty obtaining an erection. He still has desire for sexual activity that has not diminished. He completed his stress test and his chest pain has resolved.     Current Outpatient Medications   Medication Sig Dispense Refill    albuterol 108 (90 Base) MCG/ACT Inhalation Aero Soln Inhale 2 puffs into the lungs every 6 (six) hours as needed for Wheezing. 18 g 0    methylPREDNISolone (MEDROL) 4 MG Oral Tablet Therapy Pack As directed. 21 each 0    benzonatate 200 MG Oral Cap Take 1 capsule (200 mg total) by mouth 3 (three) times daily as needed. 20 capsule 0    VALSARTAN 80 MG Oral Tab TAKE 1 TABLET BY MOUTH EVERY DAY 90 tablet 0    escitalopram 10 MG Oral Tab Take 1 tablet (10 mg total) by mouth daily. 90 tablet 1    pravastatin 40 MG Oral Tab Take 1 tablet (40 mg total) by mouth nightly. 90 tablet 3    Multiple Vitamins-Minerals (PRESERVISION AREDS) Oral Tab Take 2 tablets by mouth daily. (Patient not taking: Reported on 11/28/2023)      Multiple Vitamins-Minerals (MULTI-VITAMIN/MINERALS) Oral Tab Take 1 tablet by mouth daily. (Patient not taking: Reported on 11/28/2023)        Allergies   Allergen Reactions    Lipitor [Atorvastatin Calcium] MYALGIA      Past Medical History:   Diagnosis Date    Actinic keratosis     Allergic rhinitis, cause unspecified 6/29/2012    ANXIETY     Anxiety 5/20/2016    Anxiety state, unspecified     BACK PAIN     Basal cell carcinoma     Depression     Donor of unspecified organ or tissue 6/29/2012    Encounter for long-term (current) use of other medications 6/29/2012    External hemorrhoids without mention of complication 6/29/2012    Foot and toe(s), superficial  foreign body (splinter), without major open wound and without mention of infection     High blood pressure     High cholesterol     HYPERTENSION     HYPERTRIGLYCERIDEMIA     Lumbar scoliosis 2014    Melanoma (HCC)     , resected    Melanoma of skin, site unspecified 2012    Psychosexual dysfunction with inhibited sexual excitement     Sciatica 2012    Sinusitis 2013    Spinal stenosis, lumbar region, without neurogenic claudication 2009    Spondylolisthesis of lumbar region 2016    Squamous cell carcinoma     Unspecified essential hypertension     Visual impairment     glasses      Patient Active Problem List   Diagnosis    Diverticulosis    Pure hypercholesterolemia    Essential hypertension, benign    Lumbar foraminal stenosis    History of colon cancer, in his father age 72    Anxiety    S/P lumbar fusion    Idiopathic scoliosis of lumbar region    Diastasis recti      Past Surgical History:   Procedure Laterality Date    BACK SURGERY  16    L5-S1 ALIF    COLONOSCOPY  2006    exstensive diverticulosis coli  Dr Nate Hernandez    COLONOSCOPY  6/23/15    Dr.David Hernandez rpt in 3 years     COLONOSCOPY  2018    Dr. Nate Hernandez repeat in 3 yrs    OTHER SURGICAL HISTORY      resection to remove melanoma    TONSILLECTOMY        Family History   Problem Relation Age of Onset    Breast Cancer Sister     Cancer Father         colon    Cancer Mother     Heart Attack Mother       Social History     Socioeconomic History    Marital status:    Tobacco Use    Smoking status: Former     Packs/day: 0.50     Years: 17.00     Additional pack years: 0.00     Total pack years: 8.50     Types: Cigarettes     Quit date: 1987     Years since quittin.0    Smokeless tobacco: Never   Vaping Use    Vaping Use: Never used   Substance and Sexual Activity    Alcohol use: Yes     Comment: daily--2 drinks    Drug use: No    Sexual activity: Not Currently   Other Topics  Concern    Caffeine Concern Yes    Exercise Yes         REVIEW OF SYSTEMS:   GENERAL: feels well otherwise, no further chest pain or dyspnea. See HPI    EXAM:   /66   Pulse 90   Temp 97.2 °F (36.2 °C) (Temporal)   Resp 16   Ht 5' 8\" (1.727 m)   Wt 217 lb (98.4 kg)   SpO2 98%   BMI 32.99 kg/m²   GENERAL: Well developed, well nourished,in no apparent distress  SKIN: No rash  EYES: PERRLA, EOMI, conjunctiva are clear  HEENT: atraumatic, normocephalic  LUNGS: clear to auscultation  CARDIO: RRR without murmur    ASSESSMENT AND PLAN:   Eloy Sanchez is a 73 year old male who presents for evaluation    Erectile dysfunction  Suspect psychogenic etiology of his symptoms based on our discussion and measures to overcome this. We also discussed possible metabolic causes. We will obtain thyroid studies with his next lab draw. We discussed options for medications which he wishes to defer at this time.     Chest pain, unspecified type  Symptoms resolved. Stress echocardiogram normal.     All questions were answered and the patient agrees with the plan.     Thank you,  Garrison Li MD

## 2024-01-23 RX ORDER — VALSARTAN 80 MG/1
TABLET ORAL
Qty: 90 TABLET | Refills: 0 | Status: SHIPPED | OUTPATIENT
Start: 2024-01-23

## 2024-02-01 RX ORDER — ESCITALOPRAM OXALATE 10 MG/1
10 TABLET ORAL DAILY
Qty: 90 TABLET | Refills: 1 | Status: SHIPPED | OUTPATIENT
Start: 2024-02-01

## 2024-02-01 NOTE — TELEPHONE ENCOUNTER
Last VISIT:01/19/2024 MD QUINN    Last CPE:06/20/2023 MD QUINN    Last REFILL:08/03/2023   Medication Quantity Refills Start End   escitalopram 10 MG Oral Tab 90 tablet 1         Last LABS:12/25/2023 ordered by DR. Villar    Future Appointments   Date Time Provider Department Center   3/11/2024  2:15 PM Twan Padilla MD [facility]   6/11/2024 10:00 AM Garrison Li MD [facility]         Per PROTOCOL? Non Protocol    Please Approve or Deny.

## 2024-03-05 ENCOUNTER — OFFICE VISIT (OUTPATIENT)
Dept: INTERNAL MEDICINE CLINIC | Facility: CLINIC | Age: 74
End: 2024-03-05
Payer: MEDICARE

## 2024-03-05 VITALS
DIASTOLIC BLOOD PRESSURE: 72 MMHG | TEMPERATURE: 98 F | OXYGEN SATURATION: 96 % | HEART RATE: 95 BPM | BODY MASS INDEX: 34 KG/M2 | SYSTOLIC BLOOD PRESSURE: 120 MMHG | WEIGHT: 222.38 LBS

## 2024-03-05 DIAGNOSIS — R06.2 WHEEZING: ICD-10-CM

## 2024-03-05 DIAGNOSIS — R05.2 SUBACUTE COUGH: Primary | ICD-10-CM

## 2024-03-05 PROCEDURE — 99214 OFFICE O/P EST MOD 30 MIN: CPT | Performed by: FAMILY MEDICINE

## 2024-03-05 NOTE — PROGRESS NOTES
Eloy Sanchez  3/1/1950    Chief Complaint   Patient presents with    Wheezing     F/u on symptoms        HPI:   Eloy Sanchez is a 74 year old male who presents for follow-up from  visit when he was in [place]. He had initial symptoms of URI in Dec and was treated with an oral steroid course and albuterol. His symptoms returned in early Feb and was seen in a [facility] care prior to a cruise and had another course of Prednisone. He Then had evaluation in [place] after the cruise and had a 10 day prednisone taper. He completed his last taper and now feels well. No wheezing, no cough now, no dyspnea. He has a remote history of smoking and quit in 1987.     Current Outpatient Medications   Medication Sig Dispense Refill    escitalopram 10 MG Oral Tab Take 1 tablet (10 mg total) by mouth daily. 90 tablet 1    VALSARTAN 80 MG Oral Tab TAKE 1 TABLET BY MOUTH EVERY DAY 90 tablet 0    albuterol 108 (90 Base) MCG/ACT Inhalation Aero Soln Inhale 2 puffs into the lungs every 6 (six) hours as needed for Wheezing. 18 g 0    benzonatate 200 MG Oral Cap Take 1 capsule (200 mg total) by mouth 3 (three) times daily as needed. 20 capsule 0    pravastatin 40 MG Oral Tab Take 1 tablet (40 mg total) by mouth nightly. 90 tablet 3    methylPREDNISolone (MEDROL) 4 MG Oral Tablet Therapy Pack As directed. (Patient not taking: Reported on 3/5/2024) 21 each 0    Multiple Vitamins-Minerals (PRESERVISION AREDS) Oral Tab Take 2 tablets by mouth daily. (Patient not taking: Reported on 11/28/2023)      Multiple Vitamins-Minerals (MULTI-VITAMIN/MINERALS) Oral Tab Take 1 tablet by mouth daily. (Patient not taking: Reported on 3/5/2024)        Allergies   Allergen Reactions    Lipitor [Atorvastatin Calcium] MYALGIA      Past Medical History:   Diagnosis Date    Actinic keratosis     Allergic rhinitis, cause unspecified 6/29/2012    ANXIETY     Anxiety 5/20/2016    Anxiety state, unspecified     BACK PAIN     Basal cell carcinoma      Depression     Donor of unspecified organ or tissue 6/29/2012    Encounter for long-term (current) use of other medications 6/29/2012    External hemorrhoids without mention of complication 6/29/2012    Foot and toe(s), superficial foreign body (splinter), without major open wound and without mention of infection     High blood pressure     High cholesterol     HYPERTENSION     HYPERTRIGLYCERIDEMIA     Lumbar scoliosis 5/23/2014    Melanoma (HCC)     2007, resected    Melanoma of skin, site unspecified 6/29/2012    Psychosexual dysfunction with inhibited sexual excitement     Sciatica 6/29/2012    Sinusitis 12/11/2013    Spinal stenosis, lumbar region, without neurogenic claudication 6/17/2009    Spondylolisthesis of lumbar region 5/31/2016    Squamous cell carcinoma     Unspecified essential hypertension     Visual impairment     glasses      Patient Active Problem List   Diagnosis    Diverticulosis    Pure hypercholesterolemia    Essential hypertension, benign    Lumbar foraminal stenosis    History of colon cancer, in his father age 72    Anxiety    S/P lumbar fusion    Idiopathic scoliosis of lumbar region    Diastasis recti      Past Surgical History:   Procedure Laterality Date    BACK SURGERY  5/31/16    L5-S1 ALIF    COLONOSCOPY  12/2002 4/18/2006    exstensive diverticulosis coli  Dr Nate Hernandez    COLONOSCOPY  6/23/15    Dr.David Hernandez rpt in 3 years     COLONOSCOPY  11/06/2018    Dr. Nate Hernandez repeat in 3 yrs    OTHER SURGICAL HISTORY      resection to remove melanoma    TONSILLECTOMY        Family History   Problem Relation Age of Onset    Breast Cancer Sister     Cancer Father         colon    Cancer Mother     Heart Attack Mother       Social History     Socioeconomic History    Marital status:    Tobacco Use    Smoking status: Former     Packs/day: 0.50     Years: 17.00     Additional pack years: 0.00     Total pack years: 8.50     Types: Cigarettes     Quit date: 1/1/1987     Years since  quittin.2    Smokeless tobacco: Never   Vaping Use    Vaping Use: Never used   Substance and Sexual Activity    Alcohol use: Yes     Comment: daily--2 drinks    Drug use: No    Sexual activity: Not Currently   Other Topics Concern    Caffeine Concern Yes    Exercise Yes         REVIEW OF SYSTEMS:   GENERAL: no fever or chills, no further cough or wheezing, no dyspnea.     EXAM:   /72 (BP Location: Left arm, Patient Position: Sitting, Cuff Size: large)   Pulse 95   Temp 97.5 °F (36.4 °C) (Temporal)   Wt 222 lb 6.4 oz (100.9 kg)   SpO2 96%   BMI 33.82 kg/m²   GENERAL: Well developed, well nourished,in no apparent distress  SKIN: No rash  EYES: PERRLA, EOMI, conjunctiva are clear  HEENT: atraumatic, normocephalic  LUNGS: clear to auscultation  CARDIO: RRR without murmur    ASSESSMENT AND PLAN:   Eloy Sanchez is a 74 year old male who presents for follow-up    Subacute cough  Wheezing  Patient had persistent cough with intermittent wheezing since having a viral respiratory infection in late December.  Since that time he has had 3 rounds of oral steroids and intermittent use of albuterol.  He had a CXR in [place] that he states was overall normal. His symptoms have now resolved and he is having no persistent cough, wheezing, or dyspnea.  He was told at his most recent urgent care visit that he may have COPD and was concerned. He does have a remote smoking history but no prior history of prolonged cough or wheezing. Given his symptoms have resolved now we discussed continued monitoring and discussed further evaluation with PFT's if symptoms return.     All questions were answered and the patient agrees with the plan.     Thank you,  Garrison Li MD

## 2024-03-11 ENCOUNTER — TELEPHONE (OUTPATIENT)
Dept: INTERNAL MEDICINE CLINIC | Facility: CLINIC | Age: 74
End: 2024-03-11

## 2024-03-11 ENCOUNTER — HOSPITAL ENCOUNTER (OUTPATIENT)
Dept: GENERAL RADIOLOGY | Age: 74
Discharge: HOME | End: 2024-03-11
Attending: FAMILY MEDICINE
Payer: MEDICARE

## 2024-03-11 ENCOUNTER — OFFICE VISIT (OUTPATIENT)
Dept: INTERNAL MEDICINE CLINIC | Facility: CLINIC | Age: 74
End: 2024-03-11
Payer: MEDICARE

## 2024-03-11 VITALS
OXYGEN SATURATION: 97 % | BODY MASS INDEX: 33.34 KG/M2 | WEIGHT: 220 LBS | HEART RATE: 96 BPM | DIASTOLIC BLOOD PRESSURE: 84 MMHG | RESPIRATION RATE: 20 BRPM | SYSTOLIC BLOOD PRESSURE: 122 MMHG | TEMPERATURE: 97 F | HEIGHT: 68 IN

## 2024-03-11 DIAGNOSIS — R05.9 COUGH, UNSPECIFIED TYPE: ICD-10-CM

## 2024-03-11 DIAGNOSIS — R05.2 SUBACUTE COUGH: ICD-10-CM

## 2024-03-11 DIAGNOSIS — R06.2 WHEEZING: ICD-10-CM

## 2024-03-11 DIAGNOSIS — R05.2 SUBACUTE COUGH: Primary | ICD-10-CM

## 2024-03-11 DIAGNOSIS — R09.89 THROAT CLEARING: ICD-10-CM

## 2024-03-11 PROCEDURE — 99214 OFFICE O/P EST MOD 30 MIN: CPT | Performed by: FAMILY MEDICINE

## 2024-03-11 PROCEDURE — 71046 X-RAY EXAM CHEST 2 VIEWS: CPT | Performed by: FAMILY MEDICINE

## 2024-03-11 RX ORDER — TIOTROPIUM BROMIDE 18 UG/1
18 CAPSULE ORAL; RESPIRATORY (INHALATION) DAILY
Qty: 90 CAPSULE | Refills: 0 | Status: SHIPPED | OUTPATIENT
Start: 2024-03-11

## 2024-03-11 NOTE — TELEPHONE ENCOUNTER
Pt filled out Medical Records Request. Pt asked for copy of report, which was given. Faxed medical records request and report to [phone]. Held onto copy-which was given to JESSICA Seymour per MK instructions

## 2024-03-11 NOTE — PROGRESS NOTES
Eloy Sanchez  3/1/1950    No chief complaint on file.      HPI:   Eloy Sanchez is a 74 year old male who presents for follow-up. Since last visit, his respiratory symptoms have returned with intermittent cough and wheezing with throat clearing. No new CP or dyspnea. He completed his steroid course. No fever or chills.     Current Outpatient Medications   Medication Sig Dispense Refill    escitalopram 10 MG Oral Tab Take 1 tablet (10 mg total) by mouth daily. 90 tablet 1    VALSARTAN 80 MG Oral Tab TAKE 1 TABLET BY MOUTH EVERY DAY 90 tablet 0    albuterol 108 (90 Base) MCG/ACT Inhalation Aero Soln Inhale 2 puffs into the lungs every 6 (six) hours as needed for Wheezing. 18 g 0    benzonatate 200 MG Oral Cap Take 1 capsule (200 mg total) by mouth 3 (three) times daily as needed. 20 capsule 0    pravastatin 40 MG Oral Tab Take 1 tablet (40 mg total) by mouth nightly. 90 tablet 3    Multiple Vitamins-Minerals (PRESERVISION AREDS) Oral Tab Take 2 tablets by mouth daily.        Allergies   Allergen Reactions    Lipitor [Atorvastatin Calcium] MYALGIA      Past Medical History:   Diagnosis Date    Actinic keratosis     Allergic rhinitis, cause unspecified 6/29/2012    ANXIETY     Anxiety 5/20/2016    Anxiety state, unspecified     BACK PAIN     Basal cell carcinoma     Depression     Donor of unspecified organ or tissue 6/29/2012    Encounter for long-term (current) use of other medications 6/29/2012    External hemorrhoids without mention of complication 6/29/2012    Foot and toe(s), superficial foreign body (splinter), without major open wound and without mention of infection     High blood pressure     High cholesterol     HYPERTENSION     HYPERTRIGLYCERIDEMIA     Lumbar scoliosis 5/23/2014    Melanoma (HCC)     2007, resected    Melanoma of skin, site unspecified 6/29/2012    Psychosexual dysfunction with inhibited sexual excitement     Sciatica 6/29/2012    Sinusitis 12/11/2013    Spinal stenosis, lumbar  region, without neurogenic claudication 2009    Spondylolisthesis of lumbar region 2016    Squamous cell carcinoma     Unspecified essential hypertension     Visual impairment     glasses      Patient Active Problem List   Diagnosis    Diverticulosis    Pure hypercholesterolemia    Essential hypertension, benign    Lumbar foraminal stenosis    History of colon cancer, in his father age 72    Anxiety    S/P lumbar fusion    Idiopathic scoliosis of lumbar region    Diastasis recti      Past Surgical History:   Procedure Laterality Date    BACK SURGERY  16    L5-S1 ALIF    COLONOSCOPY  2006    exstensive diverticulosis coli  Dr Nate Hernandez    COLONOSCOPY  6/23/15    Dr.David Hernandez rpt in 3 years     COLONOSCOPY  2018    Dr. Nate Hernandez repeat in 3 yrs    OTHER SURGICAL HISTORY      resection to remove melanoma    TONSILLECTOMY        Family History   Problem Relation Age of Onset    Breast Cancer Sister     Cancer Father         colon    Cancer Mother     Heart Attack Mother       Social History     Socioeconomic History    Marital status:    Tobacco Use    Smoking status: Former     Packs/day: 0.50     Years: 17.00     Additional pack years: 0.00     Total pack years: 8.50     Types: Cigarettes     Quit date: 1987     Years since quittin.2    Smokeless tobacco: Never   Vaping Use    Vaping Use: Never used   Substance and Sexual Activity    Alcohol use: Yes     Comment: daily--2 drinks    Drug use: No    Sexual activity: Not Currently   Other Topics Concern    Caffeine Concern Yes    Exercise Yes         REVIEW OF SYSTEMS:   GENERAL: feels well otherwise, no fever or chills. No new CP or SOB. See HPI    EXAM:   Pulse 96   Temp 97.1 °F (36.2 °C)   Resp 20   Ht 5' 8\" (1.727 m)   Wt 220 lb (99.8 kg)   SpO2 97%   BMI 33.45 kg/m²   GENERAL: Well developed, well nourished,in no apparent distress  SKIN: No rash  EYES: PERRLA, EOMI, conjunctiva are clear  HEENT:  atraumatic, normocephalic,ears and throat are clear  NECK: supple,no adenopathy,no bruits  LUNGS: mild expiratory wheezes.   CARDIO: RRR without murmur  GI: soft, NT    ASSESSMENT AND PLAN:   Eloy Sanchez is a 74 year old male who presents for follow-up    Chronic cough  Intermittent Wheezing  Chronic throat clearing  His symptoms have been persistent despite multiple rounds of oral steroids and antibiotics.  His x-ray today shows no obvious pneumonia.  We will continue acid suppression with omeprazole as this may be a component of his throat clearing and cough and further evaluate for obstructive lung disease with PFTs.  Will begin trial of Spiriva for suspected COPD and as needed albuterol.  Follow-up pending results of his pulmonary function testing.  - XR CHEST PA + LAT CHEST (CPT=71046); Future  - tiotropium (SPIRIVA HANDIHALER) 18 MCG Inhalation Cap; Inhale 1 capsule (18 mcg total) into the lungs daily.  Dispense: 90 capsule; Refill: 0  - Complete PFT; Future    All questions were answered and the patient agrees with the plan.     Thank you,  Garrison Li MD

## 2024-03-11 NOTE — TELEPHONE ENCOUNTER
Medical release form signed by pt to request medical records from [facility]. Consent form sent to scanning.

## 2024-03-11 NOTE — TELEPHONE ENCOUNTER
Pt was seen on 3/5/24 for a cough stated his cough is not getting any better after taking prednisone and using an inhaler. This has been going on since January not clearing up. Pt requested an appt scheduled him today   Future Appointments   Date Time Provider Department Center   3/11/2024 11:00 AM Garrison Li MD [facility]

## 2024-03-12 ENCOUNTER — TELEPHONE (OUTPATIENT)
Dept: INTERNAL MEDICINE CLINIC | Facility: CLINIC | Age: 74
End: 2024-03-12

## 2024-03-12 DIAGNOSIS — R05.2 SUBACUTE COUGH: Primary | ICD-10-CM

## 2024-03-12 DIAGNOSIS — R06.2 WHEEZING: ICD-10-CM

## 2024-03-12 RX ORDER — NICOTINE POLACRILEX 4 MG/1
1 GUM, CHEWING ORAL DAILY
Qty: 30 TABLET | Refills: 0 | Status: SHIPPED | OUTPATIENT
Start: 2024-03-12 | End: 2024-04-11

## 2024-03-12 RX ORDER — FLUTICASONE FUROATE AND VILANTEROL 100; 25 UG/1; UG/1
1 POWDER RESPIRATORY (INHALATION) DAILY
Qty: 60 EACH | Refills: 0 | Status: SHIPPED | OUTPATIENT
Start: 2024-03-12

## 2024-03-12 NOTE — TELEPHONE ENCOUNTER
tiotropium (SPIRIVA HANDIHALER) 18 MCG Inhalation Cap 90 capsule 0 3/11/2024 --   Sig:   Inhale 1 capsule (18 mcg total) into the lungs daily.         Pt stated his ins won't cover the above med and they will send a substitute and pt wants MK to be aware.

## 2024-03-12 NOTE — TELEPHONE ENCOUNTER
PSR indicates the pharmacy will fax a request for a substitute.    MK, do you want to send a substitute to the pharmacy or pt needs to call his insurance for equivalent they will cover?

## 2024-03-20 ENCOUNTER — RT VISIT (OUTPATIENT)
Dept: RESPIRATORY THERAPY | Facility: HOSPITAL | Age: 74
End: 2024-03-20
Attending: FAMILY MEDICINE
Payer: MEDICARE

## 2024-03-20 DIAGNOSIS — R06.2 WHEEZING: ICD-10-CM

## 2024-03-20 DIAGNOSIS — R05.9 COUGH, UNSPECIFIED TYPE: ICD-10-CM

## 2024-03-20 PROCEDURE — 94060 EVALUATION OF WHEEZING: CPT | Performed by: INTERNAL MEDICINE

## 2024-03-20 PROCEDURE — 94729 DIFFUSING CAPACITY: CPT | Performed by: INTERNAL MEDICINE

## 2024-03-20 PROCEDURE — 94726 PLETHYSMOGRAPHY LUNG VOLUMES: CPT | Performed by: INTERNAL MEDICINE

## 2024-03-21 NOTE — PROCEDURES
Pulmonary Function Test:   Findings:  Spirometry: FEV1 is 2.20 L, 90% predicted. FVC is 3.29 L, 103% predicted and FEV1/ FVC ratio is 0.  67.  There is no significant bronchodilator response after administration of albuterol.   The flow-volume loop demonstrates appears normal  Lung Volumes:                                                                     The TLC is 5.19 L, 93% predicted.  The residual volume 2.26 L, 99% predicted.  Diffusion Capacity:  The diffusion capacity is 2.15 or and 11% predicted and 116% predicted when corrected for alveolar volume.     Impression:  There is no airway obstruction /restrictive process on spirometry and visualized on flow-volume loop.  There is no significant bronchodilator response, but this does not preclude treatment with bronchodilators..    Lung volumes appear within normal limits.    Diffusion capacity is within normal range    There are no previous pulmonary function tests available for comparison.     Disclaimer: This PFT has been interpreted based on Z-score/LLN/ULN criteria as described in ATS guidelines 2022.  Orlando Carrizales MD

## 2024-03-22 ENCOUNTER — TELEPHONE (OUTPATIENT)
Dept: INTERNAL MEDICINE CLINIC | Facility: CLINIC | Age: 74
End: 2024-03-22

## 2024-03-22 DIAGNOSIS — R05.9 COUGH, UNSPECIFIED TYPE: ICD-10-CM

## 2024-03-22 DIAGNOSIS — R06.2 WHEEZING: Primary | ICD-10-CM

## 2024-04-04 DIAGNOSIS — R09.89 THROAT CLEARING: ICD-10-CM

## 2024-04-04 NOTE — TELEPHONE ENCOUNTER
Received fax of 90 day prescription request from pharmacy. Request has a 90 day quantity of Omeprazole DR 20mg capsule.    [facility]/pharmacy #[id number] - [place], [place] - [address] AT [address], [phone], [phone]   [address] [facility] [address]   Phone: [phone] Fax: [phone]

## 2024-04-08 RX ORDER — NICOTINE POLACRILEX 4 MG/1
1 GUM, CHEWING ORAL DAILY
Qty: 30 TABLET | Refills: 0 | Status: SHIPPED | OUTPATIENT
Start: 2024-04-08 | End: 2024-05-08

## 2024-04-09 DIAGNOSIS — R05.2 SUBACUTE COUGH: ICD-10-CM

## 2024-04-09 DIAGNOSIS — R06.2 WHEEZING: ICD-10-CM

## 2024-04-09 NOTE — TELEPHONE ENCOUNTER
Dr. Edgar Veronica would be a good fit for the patient.
Future Appointments   Date Time Provider Cal[facility] Gu[facility]upe   6/17/2022 12:20 PM Nano Ovalles MD [facility]     Pt wishes to stay with [facility]
LOV with AD 8/20/21 for cpe. Looks like pt is due for f/u per AD OV note. AD, refer to ortho or OV?
Last OV 8/31/20 with DIANE.
Leobardo Jackman MD  [facility]; Johnna Fragoso MD; [facility] 18 hours ago (10:11 PM)       Last aha and ov Dr. Jasper Blanco, I recommend Dr. Glendy Diaz, is pt scheduled for aha?
Lm for pt to schedule 6 mo fu with AD. Pt was due back in February.       Supervisit due in August.
Pt calling for name of ortho to see for his shoulder pain he has had for 2 months-we did not see him for this issue he would like to go straight to ortho but wants name.
Referral placed. Information provided to pt via [facility]. Hold for read.
Yes

## 2024-04-10 RX ORDER — FLUTICASONE FUROATE AND VILANTEROL TRIFENATATE 100; 25 UG/1; UG/1
1 POWDER RESPIRATORY (INHALATION) DAILY
Qty: 60 EACH | Refills: 0 | Status: SHIPPED | OUTPATIENT
Start: 2024-04-10

## 2024-04-10 NOTE — TELEPHONE ENCOUNTER
Requested Prescriptions     Pending Prescriptions Disp Refills    BREO ELLIPTA 100-25 MCG/ACT Inhalation Aerosol Powder, Breath Activated [Pharmacy Med Name: BREO ELLIPTA 100-25 MCG INHALR] 60 each 0     Sig: TAKE 1 PUFF BY MOUTH EVERY DAY       LOV: 3--mk-cough    LAST CPE: 6--mk-physical    Last Labs: 12--cmp    Last Refill: 3--60 each with 0 refills     Your appointments       Date & Time Appointment Department ([place])    Apr 12, 2024 10:00 AM CDT Exam - New Patient with Zaheer Valdivia MD [facility] ([facility])        Jun 11, 2024 10:00 AM CDT MA Supervisit with Garrison Li MD [address] ([facility])        Jun 17, 2024 8:30 AM CDT Established Patient with Twan Padilla MD [facility] ([facility])              [address]  [facility]  [address]  [facility] [id number] [phone] [facility]  [address]  [facility] [address]  [phone] [facility]  [facility]  [address]  [facility] [address]  [phone]

## 2024-04-11 ENCOUNTER — OFFICE VISIT (OUTPATIENT)
Facility: CLINIC | Age: 74
End: 2024-04-11
Payer: MEDICARE

## 2024-04-11 VITALS
HEIGHT: 68 IN | OXYGEN SATURATION: 95 % | DIASTOLIC BLOOD PRESSURE: 68 MMHG | HEART RATE: 92 BPM | RESPIRATION RATE: 16 BRPM | SYSTOLIC BLOOD PRESSURE: 114 MMHG | WEIGHT: 224 LBS | BODY MASS INDEX: 33.95 KG/M2

## 2024-04-11 DIAGNOSIS — J06.9 VIRAL URI WITH COUGH: ICD-10-CM

## 2024-04-11 PROCEDURE — 99204 OFFICE O/P NEW MOD 45 MIN: CPT | Performed by: INTERNAL MEDICINE

## 2024-04-11 RX ORDER — ALBUTEROL SULFATE 90 UG/1
2 AEROSOL, METERED RESPIRATORY (INHALATION) EVERY 6 HOURS PRN
Qty: 18 G | Refills: 10 | Status: SHIPPED | OUTPATIENT
Start: 2024-04-11

## 2024-04-11 NOTE — PROGRESS NOTES
[facility] General Pulmonary Consult Note    Chief Complaint:  Chief Complaint   Patient presents with    New Patient     Ref by PCP PFT done 3/20, dyspnea on exertion        History of Present Illness:  Eloy Sanchez is a 74 year old male who presents today for evaluation of COPD.  Patient has remote smoking history, approximately 8 pack years.  Pulmonary function test completed that did not show any evidence of airway obstruction.  Patient had some shortness of breath on exertion that was associated with a viral type illness.  Patient is currently on Breo with good improvement of breathing.  Patient's breathing is overall recovered.      Past Medical History:   Past Medical History:    Actinic keratosis    Allergic rhinitis, cause unspecified    ANXIETY    Anxiety    Anxiety state, unspecified    BACK PAIN    Basal cell carcinoma    Depression    Donor of unspecified organ or tissue    Encounter for long-term (current) use of other medications    External hemorrhoids without mention of complication    Foot and toe(s), superficial foreign body (splinter), without major open wound and without mention of infection    High blood pressure    High cholesterol    HYPERTENSION    HYPERTRIGLYCERIDEMIA    Lumbar scoliosis    Melanoma (HCC)    2007, resected    Melanoma of skin, site unspecified    Psychosexual dysfunction with inhibited sexual excitement    Sciatica    Sinusitis    Spinal stenosis, lumbar region, without neurogenic claudication    Spondylolisthesis of lumbar region    Squamous cell carcinoma    Unspecified essential hypertension    Visual impairment    glasses        Past Surgical History:   Past Surgical History:   Procedure Laterality Date    Back surgery  5/31/16    L5-S1 ALIF    Colonoscopy  12/2002 4/18/2006    exstensive diverticulosis coli  Dr Nate Hernandez    Colonoscopy  6/23/15    Dr.David Hernandez rpt in 3 years     Colonoscopy  11/06/2018    Dr. Nate Hernandez repeat in 3 yrs    Other surgical history       resection to remove melanoma    Tonsillectomy         Family Medical History:   Family History   Problem Relation Age of Onset    Breast Cancer Sister     Cancer Father         colon    Cancer Mother     Heart Attack Mother         Social History:   Social History     Socioeconomic History    Marital status:      Spouse name: Not on file    Number of children: Not on file    Years of education: Not on file    Highest education level: Not on file   Occupational History    Not on file   Tobacco Use    Smoking status: Former     Current packs/day: 0.00     Average packs/day: 0.5 packs/day for 17.0 years (8.5 ttl pk-yrs)     Types: Cigarettes     Start date: 1970     Quit date: 1987     Years since quittin.3     Passive exposure: Past    Smokeless tobacco: Never   Vaping Use    Vaping status: Never Used   Substance and Sexual Activity    Alcohol use: Yes     Comment: daily--2 drinks    Drug use: No    Sexual activity: Not Currently   Other Topics Concern     Service Not Asked    Blood Transfusions Not Asked    Caffeine Concern Yes    Occupational Exposure Not Asked    Hobby Hazards Not Asked    Sleep Concern Not Asked    Stress Concern Not Asked    Weight Concern Not Asked    Special Diet Not Asked    Back Care Not Asked    Exercise Yes    Bike Helmet Not Asked    Seat Belt Not Asked    Self-Exams Not Asked   Social History Narrative    Not on file     Social Determinants of Health     Financial Resource Strain: Not on file   Food Insecurity: Not on file   Transportation Needs: Not on file   Physical Activity: Not on file   Stress: Not on file   Social Connections: Not on file   Housing Stability: Not on file        Allergies: Lipitor [atorvastatin calcium]     Medications:   Current Outpatient Medications   Medication Sig Dispense Refill    albuterol 108 (90 Base) MCG/ACT Inhalation Aero Soln Inhale 2 puffs into the lungs every 6 (six) hours as needed for Wheezing. 18 g 10    BREO ELLIPTA  100-25 MCG/ACT Inhalation Aerosol Powder, Breath Activated TAKE 1 PUFF BY MOUTH EVERY DAY 60 each 0    Omeprazole 20 MG Oral Tab EC Take 1 tablet by mouth daily. 30 tablet 0    tiotropium (SPIRIVA HANDIHALER) 18 MCG Inhalation Cap Inhale 1 capsule (18 mcg total) into the lungs daily. 90 capsule 0    escitalopram 10 MG Oral Tab Take 1 tablet (10 mg total) by mouth daily. 90 tablet 1    VALSARTAN 80 MG Oral Tab TAKE 1 TABLET BY MOUTH EVERY DAY 90 tablet 0    benzonatate 200 MG Oral Cap Take 1 capsule (200 mg total) by mouth 3 (three) times daily as needed. 20 capsule 0    pravastatin 40 MG Oral Tab Take 1 tablet (40 mg total) by mouth nightly. 90 tablet 3    Multiple Vitamins-Minerals (PRESERVISION AREDS) Oral Tab Take 2 tablets by mouth daily.         Review of Systems: Review of Systems    Physical Exam:  /68 (BP Location: Right arm, Patient Position: Sitting, Cuff Size: adult)   Pulse 92   Resp 16   Ht 5' 8\" (1.727 m)   Wt 224 lb (101.6 kg)   SpO2 95%   BMI 34.06 kg/m²      Constitutional: alert, cooperative. No acute distress.  HEENT: Head NC/AT. Nares normal. Septum midline. Mucosa normal. No drainage or sinus tenderness.. Mallampati 1+  Cardio: Regular rate and rhythm. Normal S1 and S2. No murmurs.   Respiratory: Thorax symmetrical with no labored breathing. clear to auscultation bilaterally  GI: NABS. Abd soft, non-tender.  Extremities: No clubbing or cyanosis. No BLE edema.    Neurologic: A&Ox3. No gross motor deficits.  Skin: Warm, dry  Psych: Calm, cooperative. Pleasant affect.    Results:  Personally reviewed  WBC: 7.4, done on 12/25/2023.  HGB: 15.6, done on 12/25/2023.  PLT: 242, done on 12/25/2023.     Glucose: 120, done on 12/25/2023.  Cr: 1.14, done on 12/25/2023.  GFR(AA): 98, done on 6/13/2022.  GFR (non-AA): 85, done on 6/13/2022.  CA: 9, done on 12/25/2023.  Na: 136, done on 12/25/2023.  K: 4.3, done on 12/25/2023.  Cl: 106, done on 12/25/2023.  CO2: 27, done on 12/25/2023.  Last ALB  was 3.8% done on 12/25/2023.     XR CHEST PA + LAT CHEST (CPT=71046)    Result Date: 3/11/2024  CONCLUSION:  Stable chronic parenchymal changes without consolidation mild bronchitis is suspected.   LOCATION:  [facility]   Dictated by (CST): Garrison Pinzon MD on 3/11/2024 at 12:15 PM     Finalized by (CST): Garrison Pinzon MD on 3/11/2024 at 12:18 PM         Assessment/Plan:  #1.  Shortness of breath, no evidence of COPD on PFTs  Likely component of postviral bronchitis that has gotten better  Can use albuterol as needed  PFTs were reviewed with patient        Return in about 3 months (around 7/11/2024).    Zaheer Valdivia MD  4/11/2024

## 2024-04-26 RX ORDER — VALSARTAN 80 MG/1
TABLET ORAL
Qty: 90 TABLET | Refills: 0 | Status: SHIPPED | OUTPATIENT
Start: 2024-04-26

## 2024-04-27 ENCOUNTER — OFFICE VISIT (OUTPATIENT)
Dept: FAMILY MEDICINE CLINIC | Facility: CLINIC | Age: 74
End: 2024-04-27
Payer: MEDICARE

## 2024-04-27 VITALS
BODY MASS INDEX: 29.62 KG/M2 | HEART RATE: 81 BPM | RESPIRATION RATE: 18 BRPM | WEIGHT: 200 LBS | HEIGHT: 69 IN | OXYGEN SATURATION: 95 % | TEMPERATURE: 99 F | DIASTOLIC BLOOD PRESSURE: 78 MMHG | SYSTOLIC BLOOD PRESSURE: 120 MMHG

## 2024-04-27 DIAGNOSIS — H69.91 EUSTACHIAN TUBE DYSFUNCTION, RIGHT: Primary | ICD-10-CM

## 2024-04-27 PROCEDURE — 99213 OFFICE O/P EST LOW 20 MIN: CPT | Performed by: NURSE PRACTITIONER

## 2024-04-27 NOTE — PROGRESS NOTES
CHIEF COMPLAINT:     Chief Complaint   Patient presents with    Ear Pain     Right ear pain. Started overnight        HPI:   Eloy Sanchez is a 74 year old male who presents to clinic today with complaints of right ear pain. Began overnight, coming and going, feels a bit better this am.  Pain is described as shooting/throbbing, intermittent.  Patient denies history of ear infections. Home treatment includes none.     Associated symptoms:  Patient denies decreased hearing. Patient denies hearing loss. Patient denies drainage. Patient denies use of cotton tipped ear swabs to clean the ears. Patient reports following URI symptoms: none    Current Outpatient Medications   Medication Sig Dispense Refill    VALSARTAN 80 MG Oral Tab TAKE 1 TABLET BY MOUTH EVERY DAY 90 tablet 0    triamcinolone 0.1 % External Cream Apply 1 Application topically 2 (two) times daily. 80 g 1    albuterol 108 (90 Base) MCG/ACT Inhalation Aero Soln Inhale 2 puffs into the lungs every 6 (six) hours as needed for Wheezing. 18 g 10    BREO ELLIPTA 100-25 MCG/ACT Inhalation Aerosol Powder, Breath Activated TAKE 1 PUFF BY MOUTH EVERY DAY 60 each 0    Omeprazole 20 MG Oral Tab EC Take 1 tablet by mouth daily. 30 tablet 0    tiotropium (SPIRIVA HANDIHALER) 18 MCG Inhalation Cap Inhale 1 capsule (18 mcg total) into the lungs daily. 90 capsule 0    escitalopram 10 MG Oral Tab Take 1 tablet (10 mg total) by mouth daily. 90 tablet 1    benzonatate 200 MG Oral Cap Take 1 capsule (200 mg total) by mouth 3 (three) times daily as needed. 20 capsule 0    pravastatin 40 MG Oral Tab Take 1 tablet (40 mg total) by mouth nightly. 90 tablet 3    Multiple Vitamins-Minerals (PRESERVISION AREDS) Oral Tab Take 2 tablets by mouth daily.        Past Medical History:    Actinic keratosis    Allergic rhinitis, cause unspecified    ANXIETY    Anxiety    Anxiety state, unspecified    BACK PAIN    Basal cell carcinoma    Depression    Donor of unspecified organ or tissue     Encounter for long-term (current) use of other medications    External hemorrhoids without mention of complication    Foot and toe(s), superficial foreign body (splinter), without major open wound and without mention of infection    High blood pressure    High cholesterol    HYPERTENSION    HYPERTRIGLYCERIDEMIA    Lumbar scoliosis    Melanoma (HCC)    2007, resected    Melanoma of skin, site unspecified    Psychosexual dysfunction with inhibited sexual excitement    Sciatica    Sinusitis    Spinal stenosis, lumbar region, without neurogenic claudication    Spondylolisthesis of lumbar region    Squamous cell carcinoma    Unspecified essential hypertension    Visual impairment    glasses      Social History:  Social History     Socioeconomic History    Marital status:    Tobacco Use    Smoking status: Former     Current packs/day: 0.00     Average packs/day: 0.5 packs/day for 17.0 years (8.5 ttl pk-yrs)     Types: Cigarettes     Start date: 1970     Quit date: 1987     Years since quittin.3     Passive exposure: Past    Smokeless tobacco: Never   Vaping Use    Vaping status: Never Used   Substance and Sexual Activity    Alcohol use: Yes     Comment: daily--2 drinks    Drug use: No    Sexual activity: Not Currently   Other Topics Concern    Caffeine Concern Yes    Exercise Yes        REVIEW OF SYSTEMS:   GENERAL: See HPI  SKIN: no unusual skin lesions or rashes  HEENT: See HPI  LUNGS: No shortness of breath, or wheezing.  CARDIOVASCULAR: No chest pain, palpitations  GI: No N/V/C/D.  NEURO: denies headaches or dizziness    EXAM:   /78   Pulse 81   Temp 98.7 °F (37.1 °C)   Resp 18   Ht 5' 9\" (1.753 m)   Wt 200 lb (90.7 kg)   SpO2 95%   BMI 29.53 kg/m²   GENERAL: well developed, well nourished,in no apparent distress  SKIN: no rashes,no suspicious lesions  HEAD: atraumatic, normocephalic  EYES: conjunctiva clear, EOM intact  EARS: bilat tragus non tender with manipulation.  External  auditory canals minimal cerumen. Right TM: grey, no bulging, no retraction, + effusion, bony landmarks visible.  Left TM: grey, no bulging, no retraction,no effusion, bony landmarks visible.  NOSE: nostrils patent, no nasal mucus, nasal mucosa pink moist  THROAT: oral mucosa pink, moist. Posterior pharynx not erythematous or injected. No exudates.  NECK: supple, non-tender  LUNGS: clear to auscultation bilaterally, no wheezes or rhonchi. Breathing is non labored.  CARDIO: RRR without murmur  EXTREMITIES: no cyanosis, clubbing or edema  LYMPH: no cervical lymphadenopathy.      ASSESSMENT AND PLAN:   Eloy Sanchez is a 74 year old male who presents with ear problems symptoms are consistent with    ASSESSMENT:  Encounter Diagnosis   Name Primary?    Eustachian tube dysfunction, right Yes       PLAN:   Flonase otc as directed  Comfort measures as described in Patient Instructions  Acetaminophen or NSAID prn pain.      Follow up with PCP if s/sx worsen, do not begin to improve in 5-7 days, or if fever of 100.4 or greater persists for 72 hours.      Patient voiced understand and is in agreement with treatment plan.    There are no Patient Instructions on file for this visit.

## 2024-05-13 DIAGNOSIS — R09.89 THROAT CLEARING: ICD-10-CM

## 2024-05-15 RX ORDER — OMEPRAZOLE 20 MG/1
20 CAPSULE, DELAYED RELEASE ORAL DAILY
Qty: 90 CAPSULE | Refills: 3 | Status: SHIPPED | OUTPATIENT
Start: 2024-05-15

## 2024-05-15 NOTE — TELEPHONE ENCOUNTER
Refill passed per [facility] protocols.    Requested Prescriptions   Pending Prescriptions Disp Refills    OMEPRAZOLE 20 MG Oral Capsule Delayed Release [Pharmacy Med Name: OMEPRAZOLE DR 20 MG CAPSULE] 30 capsule 0     Sig: TAKE 1 CAPSULE BY MOUTH EVERY DAY       Gastrointestional Medication Protocol Passed - 5/13/2024  2:35 PM        Passed - In person appointment or virtual visit in the past 12 mos or appointment in next 3 mos     Recent Outpatient Visits              2 weeks ago Eustachian tube dysfunction, right    [facility], [address] [place] [address] Martha Perez APRN    Office Visit    4 weeks ago Other specified dermatitis    [facility], [facility]    Office Visit    1 month ago Viral URI with cough    [facility], [facility] SheaZaheer smith MD    Office Visit    2 months ago Subacute cough    [facility], [address] [place] [address] Garrison Li MD    Office Visit    2 months ago Actinic keratosis    [facility]    Office Visit          Future Appointments         Provider Department Appt Notes    In 3 weeks Garrison Li MD [facility], [address] [place] [address]    In 1 month Twan Padilla MD [facility]

## 2024-06-04 ENCOUNTER — TELEPHONE (OUTPATIENT)
Dept: INTERNAL MEDICINE CLINIC | Facility: CLINIC | Age: 74
End: 2024-06-04

## 2024-06-04 DIAGNOSIS — Z12.5 SCREENING FOR MALIGNANT NEOPLASM OF PROSTATE: ICD-10-CM

## 2024-06-04 DIAGNOSIS — E78.00 PURE HYPERCHOLESTEROLEMIA: ICD-10-CM

## 2024-06-04 DIAGNOSIS — Z00.00 ROUTINE GENERAL MEDICAL EXAMINATION AT A HEALTH CARE FACILITY: Primary | ICD-10-CM

## 2024-06-04 DIAGNOSIS — I10 ESSENTIAL HYPERTENSION, BENIGN: ICD-10-CM

## 2024-06-04 NOTE — TELEPHONE ENCOUNTER
Orders to   [facility]    Pt aware to get labs done no sooner than 2 weeks prior to the appt.  Pt aware to fast.  No call back required.    Patient plans on going to get labs tomorrow.    Future Appointments   Date Time Provider Department Center   6/11/2024 10:00 AM Garrison Li MD [facility]

## 2024-06-06 ENCOUNTER — LAB ENCOUNTER (OUTPATIENT)
Dept: LAB | Age: 74
End: 2024-06-06
Attending: FAMILY MEDICINE
Payer: MEDICARE

## 2024-06-06 DIAGNOSIS — E78.00 PURE HYPERCHOLESTEROLEMIA: ICD-10-CM

## 2024-06-06 DIAGNOSIS — Z12.5 SCREENING FOR MALIGNANT NEOPLASM OF PROSTATE: ICD-10-CM

## 2024-06-06 DIAGNOSIS — Z00.00 ROUTINE GENERAL MEDICAL EXAMINATION AT A HEALTH CARE FACILITY: ICD-10-CM

## 2024-06-06 DIAGNOSIS — I10 ESSENTIAL HYPERTENSION, BENIGN: ICD-10-CM

## 2024-06-06 DIAGNOSIS — N52.9 ERECTILE DYSFUNCTION, UNSPECIFIED ERECTILE DYSFUNCTION TYPE: ICD-10-CM

## 2024-06-06 LAB
ALBUMIN SERPL-MCNC: 4 G/DL (ref 3.4–5)
ALBUMIN/GLOB SERPL: 1.3 {RATIO} (ref 1–2)
ALP LIVER SERPL-CCNC: 73 U/L
ALT SERPL-CCNC: 26 U/L
ANION GAP SERPL CALC-SCNC: 3 MMOL/L (ref 0–18)
AST SERPL-CCNC: 20 U/L (ref 15–37)
BASOPHILS # BLD AUTO: 0.06 X10(3) UL (ref 0–0.2)
BASOPHILS NFR BLD AUTO: 0.9 %
BILIRUB SERPL-MCNC: 0.7 MG/DL (ref 0.1–2)
BUN BLD-MCNC: 23 MG/DL (ref 9–23)
CALCIUM BLD-MCNC: 8.9 MG/DL (ref 8.5–10.1)
CHLORIDE SERPL-SCNC: 101 MMOL/L (ref 98–112)
CHOLEST SERPL-MCNC: 189 MG/DL (ref ?–200)
CO2 SERPL-SCNC: 29 MMOL/L (ref 21–32)
COMPLEXED PSA SERPL-MCNC: 1.49 NG/ML (ref ?–4)
CREAT BLD-MCNC: 0.92 MG/DL
EGFRCR SERPLBLD CKD-EPI 2021: 88 ML/MIN/1.73M2 (ref 60–?)
EOSINOPHIL # BLD AUTO: 0.45 X10(3) UL (ref 0–0.7)
EOSINOPHIL NFR BLD AUTO: 6.7 %
ERYTHROCYTE [DISTWIDTH] IN BLOOD BY AUTOMATED COUNT: 12 %
FASTING PATIENT LIPID ANSWER: YES
FASTING STATUS PATIENT QL REPORTED: YES
GLOBULIN PLAS-MCNC: 3.2 G/DL (ref 2.8–4.4)
GLUCOSE BLD-MCNC: 102 MG/DL (ref 70–99)
HCT VFR BLD AUTO: 45.8 %
HDLC SERPL-MCNC: 66 MG/DL (ref 40–59)
HGB BLD-MCNC: 15.5 G/DL
IMM GRANULOCYTES # BLD AUTO: 0.01 X10(3) UL (ref 0–1)
IMM GRANULOCYTES NFR BLD: 0.1 %
LDLC SERPL CALC-MCNC: 108 MG/DL (ref ?–100)
LYMPHOCYTES # BLD AUTO: 2.2 X10(3) UL (ref 1–4)
LYMPHOCYTES NFR BLD AUTO: 32.5 %
MCH RBC QN AUTO: 33.6 PG (ref 26–34)
MCHC RBC AUTO-ENTMCNC: 33.8 G/DL (ref 31–37)
MCV RBC AUTO: 99.3 FL
MONOCYTES # BLD AUTO: 0.7 X10(3) UL (ref 0.1–1)
MONOCYTES NFR BLD AUTO: 10.4 %
NEUTROPHILS # BLD AUTO: 3.34 X10 (3) UL (ref 1.5–7.7)
NEUTROPHILS # BLD AUTO: 3.34 X10(3) UL (ref 1.5–7.7)
NEUTROPHILS NFR BLD AUTO: 49.4 %
NONHDLC SERPL-MCNC: 123 MG/DL (ref ?–130)
OSMOLALITY SERPL CALC.SUM OF ELEC: 280 MOSM/KG (ref 275–295)
PLATELET # BLD AUTO: 247 10(3)UL (ref 150–450)
POTASSIUM SERPL-SCNC: 4.1 MMOL/L (ref 3.5–5.1)
PROT SERPL-MCNC: 7.2 G/DL (ref 6.4–8.2)
RBC # BLD AUTO: 4.61 X10(6)UL
SODIUM SERPL-SCNC: 133 MMOL/L (ref 136–145)
TRIGL SERPL-MCNC: 83 MG/DL (ref 30–149)
TSI SER-ACNC: 2.52 MIU/ML (ref 0.36–3.74)
VLDLC SERPL CALC-MCNC: 14 MG/DL (ref 0–30)
WBC # BLD AUTO: 6.8 X10(3) UL (ref 4–11)

## 2024-06-06 PROCEDURE — 80061 LIPID PANEL: CPT

## 2024-06-06 PROCEDURE — 85025 COMPLETE CBC W/AUTO DIFF WBC: CPT

## 2024-06-06 PROCEDURE — 84443 ASSAY THYROID STIM HORMONE: CPT

## 2024-06-06 PROCEDURE — 36415 COLL VENOUS BLD VENIPUNCTURE: CPT

## 2024-06-06 PROCEDURE — 80053 COMPREHEN METABOLIC PANEL: CPT

## 2024-06-11 ENCOUNTER — OFFICE VISIT (OUTPATIENT)
Dept: INTERNAL MEDICINE CLINIC | Facility: CLINIC | Age: 74
End: 2024-06-11
Payer: MEDICARE

## 2024-06-11 VITALS
OXYGEN SATURATION: 99 % | SYSTOLIC BLOOD PRESSURE: 130 MMHG | HEART RATE: 74 BPM | BODY MASS INDEX: 33 KG/M2 | WEIGHT: 220.19 LBS | DIASTOLIC BLOOD PRESSURE: 80 MMHG | TEMPERATURE: 97 F

## 2024-06-11 DIAGNOSIS — K57.90 DIVERTICULOSIS: ICD-10-CM

## 2024-06-11 DIAGNOSIS — Z85.038 HISTORY OF COLON CANCER: ICD-10-CM

## 2024-06-11 DIAGNOSIS — E78.00 PURE HYPERCHOLESTEROLEMIA: ICD-10-CM

## 2024-06-11 DIAGNOSIS — Z98.1 S/P LUMBAR FUSION: ICD-10-CM

## 2024-06-11 DIAGNOSIS — M62.08 DIASTASIS RECTI: ICD-10-CM

## 2024-06-11 DIAGNOSIS — Z00.00 ENCOUNTER FOR ANNUAL HEALTH EXAMINATION: Primary | ICD-10-CM

## 2024-06-11 DIAGNOSIS — I10 ESSENTIAL HYPERTENSION, BENIGN: ICD-10-CM

## 2024-06-11 DIAGNOSIS — N52.9 ERECTILE DYSFUNCTION, UNSPECIFIED ERECTILE DYSFUNCTION TYPE: ICD-10-CM

## 2024-06-11 DIAGNOSIS — F41.9 ANXIETY: ICD-10-CM

## 2024-06-11 DIAGNOSIS — Z12.11 SCREENING FOR COLON CANCER: ICD-10-CM

## 2024-06-11 DIAGNOSIS — M41.26 IDIOPATHIC SCOLIOSIS OF LUMBAR REGION: ICD-10-CM

## 2024-06-11 DIAGNOSIS — M48.061 LUMBAR FORAMINAL STENOSIS: ICD-10-CM

## 2024-06-11 DIAGNOSIS — E87.1 LOW SODIUM LEVELS: ICD-10-CM

## 2024-06-11 DIAGNOSIS — R06.00 DYSPNEA, UNSPECIFIED TYPE: ICD-10-CM

## 2024-06-11 DIAGNOSIS — R06.2 WHEEZING: ICD-10-CM

## 2024-06-11 PROCEDURE — 96160 PT-FOCUSED HLTH RISK ASSMT: CPT | Performed by: FAMILY MEDICINE

## 2024-06-11 PROCEDURE — G0439 PPPS, SUBSEQ VISIT: HCPCS | Performed by: FAMILY MEDICINE

## 2024-06-11 PROCEDURE — 99499 UNLISTED E&M SERVICE: CPT | Performed by: FAMILY MEDICINE

## 2024-06-11 PROCEDURE — 99214 OFFICE O/P EST MOD 30 MIN: CPT | Performed by: FAMILY MEDICINE

## 2024-06-11 RX ORDER — SILDENAFIL 25 MG/1
25 TABLET, FILM COATED ORAL
Qty: 30 TABLET | Refills: 0 | Status: SHIPPED | OUTPATIENT
Start: 2024-06-11

## 2024-06-12 NOTE — PATIENT INSTRUCTIONS
Eloy Sanchez's SCREENING SCHEDULE   Tests on this list are recommended by your physician but may not be covered, or covered at this frequency, by your insurer.   Please check with your insurance carrier before scheduling to verify coverage.   PREVENTATIVE SERVICES FREQUENCY &  COVERAGE DETAILS LAST COMPLETION DATE   Diabetes Screening    Fasting Blood Sugar / Glucose    One screening every 12 months if never tested or if previously tested but not diagnosed with pre-diabetes   One screening every 6 months if diagnosed with pre-diabetes Lab Results   Component Value Date     (H) 06/06/2024        Cardiovascular Disease Screening    Lipid Panel  Cholesterol  Lipoprotein (HDL)  Triglycerides Covered every 5 years for all Medicare beneficiaries without apparent signs or symptoms of cardiovascular disease Lab Results   Component Value Date    CHOLEST 189 06/06/2024    HDL 66 (H) 06/06/2024     (H) 06/06/2024    TRIG 83 06/06/2024         Electrocardiogram (EKG)   Covered if needed at Welcome to Medicare, and non-screening if indicated for medical reasons 12/26/2023      Ultrasound Screening for Abdominal Aortic Aneurysm (AAA) Covered once in a lifetime for one of the following risk factors   • Men who are 65-75 years old and have ever smoked   • Anyone with a family history 10/01/2020     Colorectal Cancer Screening  Covered for ages 50-85; only need ONE of the following:    Colonoscopy   Covered every 10 years    Covered every 2 years if patient is at high risk or previous colonoscopy was abnormal 12/14/2021    Health Maintenance   Topic Date Due   • Colorectal Cancer Screening  12/14/2024       Flexible Sigmoidoscopy   Covered every 4 years -    Fecal Occult Blood Test Covered annually -   Prostate Cancer Screening    Prostate-Specific Antigen (PSA) Annually Lab Results   Component Value Date    PSA 0.62 06/16/2023     Health Maintenance   Topic Date Due   • PSA  06/06/2026      Immunizations     Influenza Covered once per flu season  Please get every year 11/28/2023  No recommendations at this time    Pneumococcal Each vaccine ([id number] & [id number]) covered once after 65 Prevnar 13: 05/05/2017    [id number]: 05/30/2018     No recommendations at this time    Hepatitis B One screening covered for patients with certain risk factors   -  No recommendations at this time    Tetanus Toxoid Not covered by Medicare Part B unless medically necessary (cut with metal); may be covered with your pharmacy prescription benefits -    Tetanus, Diptheria and Pertusis TD and TDaP Not covered by Medicare Part B -  No recommendations at this time    Zoster Not covered by Medicare Part B; may be covered with your pharmacy  prescription benefits -  No recommendations at this time     Annual Monitoring of Persistent Medications (ACE/ARB, digoxin diuretics, anticonvulsants)    Potassium Annually Lab Results   Component Value Date    K 4.1 06/06/2024         Creatinine   Annually Lab Results   Component Value Date    CREATSERUM 0.92 06/06/2024         BUN Annually Lab Results   Component Value Date    BUN 23 06/06/2024       Drug Serum Conc Annually No results found for: \"DIGOXIN\", \"DIG\", \"VALP\"

## 2024-06-12 NOTE — PROGRESS NOTES
Subjective:   Eloy Sanchez is a 74 year old male who presents for a MA (Medicare Advantage) Supervisit (Once per calendar year) and scheduled follow up of multiple significant but stable problems. He did have evaluation with pulmonology and has since stopped Breo and only using Albuterol PRN. He is interested in trying a medication for ED now has he has continued to have symptoms. N new concerns otherwise.     History/Other:   Fall Risk Assessment:   He has been screened for Falls and is High Risk. Fall Prevention information provided to patient in After Visit Summary.    Do you feel unsteady when standing or walking?: No  Do you worry about falling?: No  Have you fallen in the past year?: Yes  How many times have you fallen?: 1  Were you injured?: No     Cognitive Assessment:   He had a completely normal cognitive assessment - see flowsheet entries     Functional Ability/Status:   Eloy Sanchez has a completely normal functional assessment. See flowsheet for details.        Depression Screening (PHQ-2/PHQ-9): PHQ-2 SCORE: 0  , done 6/11/2024        <5 minutes spent screening and counseling for depression    Advanced Directives:   He does have a Living Will but we do NOT have it on file in Epic.    He does have a POA but we do NOT have it on file in Epic.    Not discussed      Patient Active Problem List   Diagnosis    Diverticulosis    Pure hypercholesterolemia    Essential hypertension, benign    Lumbar foraminal stenosis    History of colon cancer, in his father age 72    Anxiety    S/P lumbar fusion    Idiopathic scoliosis of lumbar region    Diastasis recti    Wheezing     Allergies:  He is allergic to lipitor [atorvastatin calcium].    Current Medications:  Outpatient Medications Marked as Taking for the 6/11/24 encounter (Office Visit) with Garrison Li MD   Medication Sig    Sildenafil Citrate 25 MG Oral Tab Take 1 tablet (25 mg total) by mouth daily as needed for Erectile Dysfunction.     omeprazole 20 MG Oral Capsule Delayed Release Take 1 capsule (20 mg total) by mouth daily.    VALSARTAN 80 MG Oral Tab TAKE 1 TABLET BY MOUTH EVERY DAY    triamcinolone 0.1 % External Cream Apply 1 Application topically 2 (two) times daily.    albuterol 108 (90 Base) MCG/ACT Inhalation Aero Soln Inhale 2 puffs into the lungs every 6 (six) hours as needed for Wheezing.    escitalopram 10 MG Oral Tab Take 1 tablet (10 mg total) by mouth daily.    pravastatin 40 MG Oral Tab Take 1 tablet (40 mg total) by mouth nightly.    Multiple Vitamins-Minerals (PRESERVISION AREDS) Oral Tab Take 2 tablets by mouth daily.       Medical History:  He  has a past medical history of Actinic keratosis, Allergic rhinitis, cause unspecified (6/29/2012), ANXIETY, Anxiety (5/20/2016), Anxiety state, unspecified, BACK PAIN, Basal cell carcinoma, Depression, Donor of unspecified organ or tissue (6/29/2012), Encounter for long-term (current) use of other medications (6/29/2012), External hemorrhoids without mention of complication (6/29/2012), Foot and toe(s), superficial foreign body (splinter), without major open wound and without mention of infection, High blood pressure, High cholesterol, HYPERTENSION, HYPERTRIGLYCERIDEMIA, Lumbar scoliosis (5/23/2014), Melanoma (HCC), Melanoma of skin, site unspecified (6/29/2012), Psychosexual dysfunction with inhibited sexual excitement, Sciatica (6/29/2012), Sinusitis (12/11/2013), Spinal stenosis, lumbar region, without neurogenic claudication (6/17/2009), Spondylolisthesis of lumbar region (5/31/2016), Squamous cell carcinoma, Unspecified essential hypertension, and Visual impairment.  Surgical History:  He  has a past surgical history that includes tonsillectomy; colonoscopy (12/2002 4/18/2006); other surgical history; colonoscopy (6/23/15); back surgery (5/31/16); and colonoscopy (11/06/2018).   Family History:  His family history includes Breast Cancer in his sister; Cancer in his father and  mother; Heart Attack in his mother.  Social History:  He  reports that he quit smoking about 37 years ago. His smoking use included cigarettes. He started smoking about 54 years ago. He has a 8.5 pack-year smoking history. He has been exposed to tobacco smoke. He has never used smokeless tobacco. He reports current alcohol use. He reports that he does not use drugs.    Tobacco:  He smoked tobacco in the past but quit greater than 12 months ago.  Social History     Tobacco Use   Smoking Status Former    Current packs/day: 0.00    Average packs/day: 0.5 packs/day for 17.0 years (8.5 ttl pk-yrs)    Types: Cigarettes    Start date: 1970    Quit date: 1987    Years since quittin.4    Passive exposure: Past   Smokeless Tobacco Never          CAGE Alcohol Screen:   CAGE screening score of 0 on 2024, showing low risk of alcohol abuse.      Patient Care Team:  Garrison Li MD as PCP - General (Family Medicine)  Lory Daugherty PT as Physical Therapist    Review of Systems     Negative except as in HPI    Objective:   Physical Exam  /80 (BP Location: Left arm, Patient Position: Sitting, Cuff Size: adult)   Pulse 74   Temp 97 °F (36.1 °C) (Temporal)   Wt 220 lb 3.2 oz (99.9 kg)   SpO2 99%   BMI 32.52 kg/m²  Estimated body mass index is 32.52 kg/m² as calculated from the following:    Height as of 24: 5' 9\" (1.753 m).    Weight as of this encounter: 220 lb 3.2 oz (99.9 kg).  GENERAL: Well developed, well nourished,in no apparent distress  SKIN: No rashes  EYES: PERRLA, EOMI, conjunctiva are clear  HEENT: atraumatic, normocephalic  LUNGS: CTAB  CARDIO: RRR  ABD: soft, NT    Medicare Hearing Assessment:   Hearing Screening    Time taken: 2024 10:22 AM  Entry User: Mar Cheney  Screening Method: Finger Rub  Finger Rub Result: Pass         Visual Acuity:   Right Eye Visual Acuity: Corrected Right Eye Chart Acuity: 20/25   Left Eye Visual Acuity: Corrected Left Eye Chart Acuity: 20/30   Both  Eyes Visual Acuity: Corrected Both Eyes Chart Acuity: 20/25   Able To Tolerate Visual Acuity: Yes        Assessment & Plan:   Eloy Sanchez is a 74 year old male who presents for a Medicare Assessment.     Encounter for annual health examination    Dyspnea, unspecified type  Wheezing  Has had pulmonary evaluation with Dr. Valdivia.  Suspected postviral bronchitis that is improving.  Breo stopped and only using albuterol as needed.    Pure hypercholesterolemia  Continue pravastatin at current dose.  Will repeat prior to follow-up in 6 months.    Essential hypertension, benign  Controlled on current treatment.    Lumbar foraminal stenosis  S/P lumbar fusion  Idiopathic scoliosis of lumbar region  Overall stable, continue to monitor.    Anxiety  Continue low-dose Lexapro.  Stable.    Diverticulosis  History of colon cancer, in his father age 72  Screening for colon cancer  Due for colonoscopy in December, referral placed.  - Surgery Referral - In Network    Diastasis recti  Stable.     Low sodium levels  Will repeat BMP in a few weeks, asymptomatic.   - Basic Metabolic Panel (8); Future    Erectile dysfunction, unspecified erectile dysfunction type  Begin trial of sildenafil.   - Sildenafil Citrate 25 MG Oral Tab; Take 1 tablet (25 mg total) by mouth daily as needed for Erectile Dysfunction.  Dispense: 30 tablet; Refill: 0      The patient indicates understanding of these issues and agrees to the plan.  Reinforced healthy diet, lifestyle, and exercise.     F/U in 6 months     Garrison Li MD, 6/12/2024     Supplementary Documentation:   General Health:  In the past six months, have you lost more than 10 pounds without trying?: 2 - No  Has your appetite been poor?: No  Type of Diet: Balanced  How does the patient maintain a good energy level?: Appropriate Exercise  How would you describe your daily physical activity?: Moderate  How would you describe your current health state?: Good  How do you maintain positive  mental well-being?: Visiting Friends;Games;Puzzles;Social Interaction;Visiting Family  On a scale of 0 to 10, with 0 being no pain and 10 being severe pain, what is your pain level?: 0 - (None)  In the past six months, have you experienced urine leakage?: 0-No  At any time do you feel concerned for the safety/well-being of yourself and/or your children, in your home or elsewhere?: No  Have you had any immunizations at another office such as Influenza, Hepatitis B, Tetanus, or Pneumococcal?: No        Eloy Sanchez's SCREENING SCHEDULE   Tests on this list are recommended by your physician but may not be covered, or covered at this frequency, by your insurer.   Please check with your insurance carrier before scheduling to verify coverage.   PREVENTATIVE SERVICES FREQUENCY &  COVERAGE DETAILS LAST COMPLETION DATE   Diabetes Screening    Fasting Blood Sugar / Glucose    One screening every 12 months if never tested or if previously tested but not diagnosed with pre-diabetes   One screening every 6 months if diagnosed with pre-diabetes Lab Results   Component Value Date     (H) 06/06/2024        Cardiovascular Disease Screening    Lipid Panel  Cholesterol  Lipoprotein (HDL)  Triglycerides Covered every 5 years for all Medicare beneficiaries without apparent signs or symptoms of cardiovascular disease Lab Results   Component Value Date    CHOLEST 189 06/06/2024    HDL 66 (H) 06/06/2024     (H) 06/06/2024    TRIG 83 06/06/2024         Electrocardiogram (EKG)   Covered if needed at Welcome to Medicare, and non-screening if indicated for medical reasons 12/26/2023      Ultrasound Screening for Abdominal Aortic Aneurysm (AAA) Covered once in a lifetime for one of the following risk factors    Men who are 65-75 years old and have ever smoked    Anyone with a family history 10/01/2020     Colorectal Cancer Screening  Covered for ages 50-85; only need ONE of the following:    Colonoscopy   Covered every 10  years    Covered every 2 years if patient is at high risk or previous colonoscopy was abnormal 12/14/2021    Health Maintenance   Topic Date Due    Colorectal Cancer Screening  12/14/2024       Flexible Sigmoidoscopy   Covered every 4 years -    Fecal Occult Blood Test Covered annually -   Prostate Cancer Screening    Prostate-Specific Antigen (PSA) Annually Lab Results   Component Value Date    PSA 0.62 06/16/2023     Health Maintenance   Topic Date Due    PSA  06/06/2026      Immunizations    Influenza Covered once per flu season  Please get every year 11/28/2023  No recommendations at this time    Pneumococcal Each vaccine (Htzicee67 & Rcfhaiovx38) covered once after 65 Prevnar 13: 05/05/2017    [id number]: 05/30/2018     No recommendations at this time    Hepatitis B One screening covered for patients with certain risk factors   -  No recommendations at this time    Tetanus Toxoid Not covered by Medicare Part B unless medically necessary (cut with metal); may be covered with your pharmacy prescription benefits -    Tetanus, Diptheria and Pertusis TD and TDaP Not covered by Medicare Part B -  No recommendations at this time    Zoster Not covered by Medicare Part B; may be covered with your pharmacy  prescription benefits -  No recommendations at this time     Annual Monitoring of Persistent Medications (ACE/ARB, digoxin diuretics, anticonvulsants)    Potassium Annually Lab Results   Component Value Date    K 4.1 06/06/2024         Creatinine   Annually Lab Results   Component Value Date    CREATSERUM 0.92 06/06/2024         BUN Annually Lab Results   Component Value Date    BUN 23 06/06/2024       Drug Serum Conc Annually No results found for: \"DIGOXIN\", \"DIG\", \"VALP\"

## 2024-06-27 ENCOUNTER — TELEPHONE (OUTPATIENT)
Dept: INTERNAL MEDICINE CLINIC | Facility: CLINIC | Age: 74
End: 2024-06-27

## 2024-06-27 DIAGNOSIS — R05.2 SUBACUTE COUGH: ICD-10-CM

## 2024-06-27 DIAGNOSIS — R06.2 WHEEZING: ICD-10-CM

## 2024-06-28 DIAGNOSIS — E78.00 PURE HYPERCHOLESTEROLEMIA: ICD-10-CM

## 2024-07-01 RX ORDER — PRAVASTATIN SODIUM 40 MG
40 TABLET ORAL NIGHTLY
Qty: 90 TABLET | Refills: 3 | Status: SHIPPED | OUTPATIENT
Start: 2024-07-01

## 2024-07-01 RX ORDER — FLUTICASONE FUROATE AND VILANTEROL 100; 25 UG/1; UG/1
1 POWDER RESPIRATORY (INHALATION) DAILY
Qty: 60 EACH | Refills: 1 | OUTPATIENT
Start: 2024-07-01

## 2024-07-01 NOTE — TELEPHONE ENCOUNTER
Refill passed per [facility] protocol.    Please review pended refill request as unable to refill due to high/very high interaction warning copied here:      High  Allergy/Contraindication: pravastatinReactions: MYALGIA. No reaction type specified. User documented allergy severity: Medium.  Level 2 with ATORVASTATIN CALCIUM (Class: STATINS)  Requested Prescriptions   Pending Prescriptions Disp Refills    PRAVASTATIN 40 MG Oral Tab [Pharmacy Med Name: PRAVASTATIN SODIUM 40 MG TAB] 90 tablet 3     Sig: TAKE 1 TABLET BY MOUTH EVERY DAY AT NIGHT       Cholesterol Medication Protocol Passed - 6/28/2024 12:27 AM        Passed - ALT < 80     Lab Results   Component Value Date    ALT 26 06/06/2024             Passed - ALT resulted within past year        Passed - Lipid panel within past 12 months     Lab Results   Component Value Date    CHOLEST 189 06/06/2024    TRIG 83 06/06/2024    HDL 66 (H) 06/06/2024     (H) 06/06/2024    VLDL 14 06/06/2024    TCHDLRATIO 3.75 01/22/2018    NONHDLC 123 06/06/2024             Passed - In person appointment or virtual visit in the past 12 mos or appointment in next 3 mos     Recent Outpatient Visits              2 weeks ago Neoplasm of uncertain behavior of skin    [facility] SWEET Wayne, MD    Office Visit    2 weeks ago Encounter for annual health examination    [facility], [address] [place], [place] [address]Garrison Roland MD    Office Visit    2 months ago Eustachian tube dysfunction, right    [facility]    Office Visit    2 months ago Other specified dermatitis    [facility], MD    Office Visit    2 months ago Viral URI with cough    [facility], [facility] Zaheer Valdivia MD    Office Visit          Future Appointments         Provider Department Appt Notes    In 2 months  Twan Padilla MD [facility]     In 5 months Garrison Li MD [facility], [address] [place] [address]-6 months                       Future Appointments         Provider Department Appt Notes    In 2 months Twan Padilla MD [facility]     In 5 months Garrison Li MD [facility], [address] [place] [address] FU-6 months          Recent Outpatient Visits              2 weeks ago Neoplasm of uncertain behavior of skin    [facility]    Office Visit    2 weeks ago Encounter for annual health examination    [facility], [address] [place] [address] Garrison Li MD    Office Visit    2 months ago Eustachian tube dysfunction, right    [facility], [facility], [address] [place] [address] Martha Perez APRN    Office Visit    2 months ago Other specified dermatitis    [facility], MD    Office Visit    2 months ago Viral URI with cough    [facility], [facility] Zaheer Valdivia MD    Office Visit

## 2024-07-01 NOTE — TELEPHONE ENCOUNTER
Please review. Protocol Failed; No Protocol    Requested Prescriptions   Pending Prescriptions Disp Refills    fluticasone furoate-vilanterol (BREO ELLIPTA) 100-25 MCG/ACT Inhalation Aerosol Powder, Breath Activated 60 each 0     Sig: Inhale 1 puff into the lungs daily.       Asthma & COPD Medication Protocol Failed - 6/27/2024  5:45 AM        Failed - Asthma Action Score greater than or equal to 20        Failed - AAP/ACT given in last 12 months     No data recorded  No data recorded  No data recorded  No data recorded          Passed - Appointment in past 6 or next 3 months      Recent Outpatient Visits              2 weeks ago Neoplasm of uncertain behavior of skin    [facility], MD    Office Visit    2 weeks ago Encounter for annual health examination    [facility], [address] [place] [address] Garrison Li MD    Office Visit    2 months ago Eustachian tube dysfunction, right    [facility], [facility], [address] [place] [address] Martha Perez APRN    Office Visit    2 months ago Other specified dermatitis    [facility]RISTIAN SWEET Wayne, MD    Office Visit    2 months ago Viral URI with cough    [facility], [facility] Zaheer Valdivia MD    Office Visit          Future Appointments         Provider Department Appt Notes    In 2 months Twan Padilla MD [facility]     In 5 months Garrison Li MD [address] FU-6 months                           Future Appointments         Provider Department Appt Notes    In 2 months Twan Padilla MD [facility]     In 5 months Garrison Li MD [address] FU-6 months          Recent Outpatient Visits              2 weeks ago Neoplasm of uncertain behavior of skin    [facility], MD    Office Visit    2  weeks ago Encounter for annual health examination    [facility], [address] [place] [address], LoizaGarrison Roland MD    Office Visit    2 months ago Eustachian tube dysfunction, right    [facility], [facility], [address] [place] [address], Martha Lazaro APRN    Office Visit    2 months ago Other specified dermatitis    [facility], Twan Fisher MD    Office Visit    2 months ago Viral URI with cough    [facility], [facility] Zaheer Valdivia MD    Office Visit

## 2024-07-01 NOTE — TELEPHONE ENCOUNTER
Refill passed per [facility] protocol.    Requested Prescriptions   Pending Prescriptions Disp Refills    PRAVASTATIN 40 MG Oral Tab [Pharmacy Med Name: PRAVASTATIN SODIUM 40 MG TAB] 90 tablet 3     Sig: TAKE 1 TABLET BY MOUTH EVERY DAY AT NIGHT       Cholesterol Medication Protocol Passed - 6/28/2024 12:27 AM        Passed - ALT < 80     Lab Results   Component Value Date    ALT 26 06/06/2024             Passed - ALT resulted within past year        Passed - Lipid panel within past 12 months     Lab Results   Component Value Date    CHOLEST 189 06/06/2024    TRIG 83 06/06/2024    HDL 66 (H) 06/06/2024     (H) 06/06/2024    VLDL 14 06/06/2024    TCHDLRATIO 3.75 01/22/2018    NONHDLC 123 06/06/2024             Passed - In person appointment or virtual visit in the past 12 mos or appointment in next 3 mos     Recent Outpatient Visits              2 weeks ago Neoplasm of uncertain behavior of skin    [facility], MD    Office Visit    2 weeks ago Encounter for annual health examination    [facility], [address] [place] [address] Garrison Li MD    Office Visit    2 months ago Eustachian tube dysfunction, right    [facility], [facility], [address] [place] [address] Martha Perez APRN    Office Visit    2 months ago Other specified dermatitis    [facility]RIST[facility] SWEET Wayne, MD    Office Visit    2 months ago Viral URI with cough    [facility], [facility] Zaheer Valdivia MD    Office Visit          Future Appointments         Provider Department Appt Notes    In 2 months Twan Padilla MD [facility]     In 5 months Garrison Li MD [facility], [address] [place] [address] FUP-6 months                       Future Appointments         Provider Department Appt Notes    In 2 months Twan Padilla MD [facility]     In 5 months  Garrison Li MD [facility], [address] [place] [address] FUP-6 months          Recent Outpatient Visits              2 weeks ago Neoplasm of uncertain behavior of skin    [facility] Wayne, MD    Office Visit    2 weeks ago Encounter for annual health examination    [facility], [address] [place] [address] Garrison Li MD    Office Visit    2 months ago Eustachian tube dysfunction, right    [facility], [facility], [address] [place] [address] Martha Perez APRN    Office Visit    2 months ago Other specified dermatitis    [facility], MD    Office Visit    2 months ago Viral URI with cough    [facility], [facility] Zaheer Valdivia MD    Office Visit

## 2024-07-01 NOTE — TELEPHONE ENCOUNTER
Dear nursing staff,    Patient sent My Chart requesting refill for Breo inhaler. At previous office visit on 6/11/2024 patient reported not taking. Please clarify if patient is requesting a refill . Thank you

## 2024-07-11 ENCOUNTER — TELEPHONE (OUTPATIENT)
Dept: INTERNAL MEDICINE CLINIC | Facility: CLINIC | Age: 74
End: 2024-07-11

## 2024-07-11 NOTE — TELEPHONE ENCOUNTER
Received test results from [facility] from date of service of 7-10-24. Paperwork placed in MK bin for review.

## 2024-07-28 DIAGNOSIS — R06.2 WHEEZING: ICD-10-CM

## 2024-07-28 DIAGNOSIS — R05.2 SUBACUTE COUGH: ICD-10-CM

## 2024-07-31 RX ORDER — ESCITALOPRAM OXALATE 10 MG/1
10 TABLET ORAL DAILY
Qty: 90 TABLET | Refills: 3 | Status: SHIPPED | OUTPATIENT
Start: 2024-07-31

## 2024-07-31 RX ORDER — FLUTICASONE FUROATE AND VILANTEROL 100; 25 UG/1; UG/1
1 POWDER RESPIRATORY (INHALATION) DAILY
Qty: 60 EACH | Refills: 0 | OUTPATIENT
Start: 2024-07-31

## 2024-07-31 NOTE — TELEPHONE ENCOUNTER
Refill passed per [facility] protocol.  Requested Prescriptions   Pending Prescriptions Disp Refills    BREO ELLIPTA 100-25 MCG/ACT Inhalation Aerosol Powder, Breath Activated [Pharmacy Med Name: BREO ELLIPTA 100-25 MCG INHALR] 60 each 0     Sig: TAKE 1 PUFF BY MOUTH EVERY DAY       Asthma & COPD Medication Protocol Failed - 7/28/2024  8:05 AM        Failed - Asthma Action Score greater than or equal to 20        Failed - AAP/ACT given in last 12 months     No data recorded  No data recorded  No data recorded  No data recorded          Passed - Appointment in past 6 or next 3 months      Recent Outpatient Visits              1 month ago Neoplasm of uncertain behavior of skin    [facility] S[facility] Wayne, MD    Office Visit    1 month ago Encounter for annual health examination    [facility], [address] [place] [address] Garrison Li MD    Office Visit    3 months ago Eustachian tube dysfunction, right    [facility], [facility], [address] [place] [address] Martha Perez APRN    Office Visit    3 months ago Other specified dermatitis    CHRISTIAN SWEET Wayne, MD    Office Visit    3 months ago Viral URI with cough    [facility], [facility] Zaheer Valdivia MD    Office Visit          Future Appointments         Provider Department Appt Notes    In 1 month Twan Padilla MD [facility]     In 4 months Garrison Li MD [address] FUP-6 months                      ESCITALOPRAM 10 MG Oral Tab [Pharmacy Med Name: ESCITALOPRAM 10 MG TABLET] 90 tablet 1     Sig: TAKE 1 TABLET BY MOUTH EVERY DAY       Psychiatric Non-Scheduled (Anti-Anxiety) Passed - 7/28/2024  8:05 AM        Passed - In person appointment or virtual visit in the past 6 mos or appointment in next 3 mos     Recent Outpatient Visits              1 month ago Neoplasm of uncertain  behavior of skin    [facility] Twan Padilla MD    Office Visit    1 month ago Encounter for annual health examination    [address] Garrison Li MD    Office Visit    3 months ago Eustachian tube dysfunction, right    [facility], [facility], [address] [place] [address], Martha, APRN    Office Visit    3 months ago Other specified dermatitis    [facility] Twan Padilla MD    Office Visit    3 months ago Viral URI with cough    [facility], Zaheer Jauregui MD    Office Visit          Future Appointments         Provider Department Appt Notes    In 1 month Twan Padilla MD [facility]     In 4 months Garrison Li MD [address] FUP-6 months                    Passed - Depression Screening completed within the past 12 months           Recent Outpatient Visits              1 month ago Neoplasm of uncertain behavior of skin    [facility] Twan Padilla MD    Office Visit    1 month ago Encounter for annual health examination    [address] Garrison Li MD    Office Visit    3 months ago [facility] [address], Martha, APRN    Office Visit    3 months ago Other specified dermatitis    [facility] Twan Padilla MD    Office Visit    3 months ago Viral URI with cough    [facility]    Office Visit          Future Appointments         Provider Department Appt Notes    In 1 month Twan Padilla MD [facility]     In 4 months Garrison Li MD [address]-6 months

## 2024-07-31 NOTE — TELEPHONE ENCOUNTER
Please review; protocol failed/ has no protocol    Requested Prescriptions   Pending Prescriptions Disp Refills    fluticasone furoate-vilanterol (BREO ELLIPTA) 100-25 MCG/ACT Inhalation Aerosol Powder, Breath Activated 60 each 0     Sig: Inhale 1 puff into the lungs daily.       Asthma & COPD Medication Protocol Failed - 7/28/2024  8:05 AM        Failed - Asthma Action Score greater than or equal to 20        Failed - AAP/ACT given in last 12 months     No data recorded  No data recorded  No data recorded  No data recorded          Passed - Appointment in past 6 or next 3 months      Recent Outpatient Visits              1 month ago Neoplasm of uncertain behavior of skin    CHRISTIAN SWEET Wayne, MD    Office Visit    1 month ago Encounter for annual health examination    [facility], [address] [place] [address] Garrison Li MD    Office Visit    3 months ago Eustachian tube dysfunction, right    [facility], [facility], [address] [place] [address] Martha Perez APRN    Office Visit    3 months ago Other specified dermatitis    CHRISTIAN SWEET Wayne, MD    Office Visit    3 months ago Viral URI with cough    [facility], [facility] Zaheer Valdivia MD    Office Visit          Future Appointments         Provider Department Appt Notes    In 1 month Twan Padilla MD [facility]     In 4 months Garrison Li MD [facility], [address] [place] [address] [facility]-6 months                     Signed Prescriptions Disp Refills    escitalopram 10 MG Oral Tab 90 tablet 3     Sig: Take 1 tablet (10 mg total) by mouth daily.       Psychiatric Non-Scheduled (Anti-Anxiety) Passed - 7/28/2024  8:05 AM        Passed - In person appointment or virtual visit in the past 6 mos or appointment in next 3 mos     Recent Outpatient Visits              1 month ago Neoplasm of uncertain behavior  of skin    [facility]RISTIAN SWEET Wayne, MD    Office Visit    1 month ago Encounter for annual health examination    [address] Garrison Li MD    Office Visit    3 months ago Eustachian tube dysfunction, right    [facility], [facility], [address] [place] [address], Martha, APRN    Office Visit    3 months ago Other specified dermatitis    [facility] Twan Padilla MD    Office Visit    3 months ago Viral URI with cough    [facility], Zaheer Jauregui MD    Office Visit          Future Appointments         Provider Department Appt Notes    In 1 month Twan Padilla MD [facility]     In 4 months Garrison Li MD [address] [facility]-6 months                    Passed - Depression Screening completed within the past 12 months           Recent Outpatient Visits              1 month ago Neoplasm of uncertain behavior of skin    [facility] Wayne, MD    Office Visit    1 month ago Encounter for annual health examination    [address] Garrison Li MD    Office Visit    3 months [facility] [address], Martha, [facility]    Office Visit    3 months ago Other specified dermatitis    [facility]RIST[facility] SWEET Wayne, MD    Office Visit    3 months ago Viral URI with cough    [facility] SheaZaheer smith MD    Office Visit          Future Appointments         Provider Department Appt Notes    In 1 month Twan Padilla MD [facility]     In 4 months Garrison Li MD [address]-6 months

## 2024-08-06 RX ORDER — VALSARTAN 80 MG/1
80 TABLET ORAL DAILY
Qty: 90 TABLET | Refills: 3 | Status: SHIPPED | OUTPATIENT
Start: 2024-08-06

## 2024-08-06 NOTE — TELEPHONE ENCOUNTER
Refill passed per [facility] protocol.  Requested Prescriptions   Pending Prescriptions Disp Refills    VALSARTAN 80 MG Oral Tab [Pharmacy Med Name: VALSARTAN 80 MG TABLET] 90 tablet 0     Sig: TAKE 1 TABLET BY MOUTH EVERY DAY       Hypertension Medications Protocol Passed - 8/2/2024 12:28 AM        Passed - CMP or BMP in past 12 months        Passed - Last BP reading less than 140/90     BP Readings from Last 1 Encounters:   06/11/24 130/80               Passed - In person appointment or virtual visit in the past 12 mos or appointment in next 3 mos     Recent Outpatient Visits              1 month ago Neoplasm of uncertain behavior of skin    [facility]RIST[facility] SWEET Wayne, MD    Office Visit    1 month ago Encounter for annual health examination    [facility], [address] [place] [address] Garrison Li MD    Office Visit    3 months ago Eustachian tube dysfunction, right    [facility], [facility], [address] [place] [address] Martha Perez APRN    Office Visit    3 months ago Other specified dermatitis    CHRISTIAN SWEET Wayne, MD    Office Visit    3 months ago Viral URI with cough    [facility] Zaheer Valdivia MD    Office Visit          Future Appointments         Provider Department Appt Notes    In 1 month Twan Padilla MD [facility]     In 4 months Garrison Li MD [address] FUP-6 months                    Passed - EGFRCR or GFRNAA > 50     GFR Evaluation  EGFRCR: 88 , resulted on 6/6/2024             Recent Outpatient Visits              1 month ago Neoplasm of uncertain behavior of skin    CHRISTIAN SWEET Wayne, MD    Office Visit    1 month ago Encounter for annual health examination    [address] Garrison Li MD    Office Visit    3 months ago  Eustachian tube dysfunction, right    [facility], [facility], [address] [place] [address] Martha Perez APRN    Office Visit    3 months ago Other specified dermatitis    CHRISTIAN SWEET Wayne, MD    Office Visit    3 months ago Viral URI with cough    [facility], [facility] Zaheer Valdivia MD    Office Visit          Future Appointments         Provider Department Appt Notes    In 1 month Twan Padilla MD [facility]     In 4 months Garrison Li MD [facility], [address] [place] [address] FUP-6 months             [phone]

## 2024-09-24 ENCOUNTER — OFFICE VISIT (OUTPATIENT)
Dept: FAMILY MEDICINE CLINIC | Facility: CLINIC | Age: 74
End: 2024-09-24
Payer: MEDICARE

## 2024-09-24 VITALS
HEIGHT: 69 IN | RESPIRATION RATE: 18 BRPM | TEMPERATURE: 97 F | BODY MASS INDEX: 29.62 KG/M2 | HEART RATE: 98 BPM | WEIGHT: 200 LBS | SYSTOLIC BLOOD PRESSURE: 120 MMHG | DIASTOLIC BLOOD PRESSURE: 60 MMHG | OXYGEN SATURATION: 95 %

## 2024-09-24 DIAGNOSIS — Z20.822 ENCOUNTER FOR LABORATORY TESTING FOR COVID-19 VIRUS: ICD-10-CM

## 2024-09-24 DIAGNOSIS — J06.9 VIRAL URI WITH COUGH: Primary | ICD-10-CM

## 2024-09-24 PROCEDURE — 99213 OFFICE O/P EST LOW 20 MIN: CPT | Performed by: NURSE PRACTITIONER

## 2024-09-24 PROCEDURE — 87635 SARS-COV-2 COVID-19 AMP PRB: CPT | Performed by: NURSE PRACTITIONER

## 2024-09-24 RX ORDER — TRAMADOL HYDROCHLORIDE 50 MG/1
50 TABLET ORAL
COMMUNITY
Start: 2024-09-16

## 2024-09-24 RX ORDER — ALBUTEROL SULFATE 90 UG/1
2 INHALANT RESPIRATORY (INHALATION) EVERY 4 HOURS PRN
Qty: 1 EACH | Refills: 0 | Status: SHIPPED | OUTPATIENT
Start: 2024-09-24 | End: 2024-10-08

## 2024-09-24 NOTE — PATIENT INSTRUCTIONS
I recommend the 4 H's for inflammation:    1. Heat (warm mist from the shower or warm liquids such as tea)  2. Honey (mixed in your tea or by the spoonful [if you are not diabetic; over the age of 1 year]--take a spoonful 3 times a day and don't eat or drink anything for 15-20 minutes)  3. Humidity--cool mist in the bedroom at night  4. Hydration --at least 8 -10 glasses a day     Mucinex as needed  Albuterol every 6 hours  Go to IC or ER for any worsening symptoms

## 2024-09-24 NOTE — PROGRESS NOTES
CHIEF COMPLAINT:     Chief Complaint   Patient presents with    Cough     Cough, congestion and runny nose. Symptoms for 4 days   OTC: Claritin and Breo Elipta  NO exposure   Pt had covid 3 months        HPI:   Eloy Sanchez is a 74 year old male who presents for upper respiratory symptoms for  3 days. Patient reports congestion, wet cough.  Reports he doesn't feel terrible, but had a lingering URI last winter . Symptoms have been mild since onset.  Treating symptoms with Breo yesterday and Claritin.  + Covid 3 mo ago.      Current Outpatient Medications   Medication Sig Dispense Refill    albuterol 108 (90 Base) MCG/ACT Inhalation Aero Soln Inhale 2 puffs into the lungs every 4 (four) hours as needed for Wheezing or Shortness of Breath. 1 each 0    valsartan 80 MG Oral Tab Take 1 tablet (80 mg total) by mouth daily. 90 tablet 3    escitalopram 10 MG Oral Tab Take 1 tablet (10 mg total) by mouth daily. 90 tablet 3    pravastatin 40 MG Oral Tab Take 1 tablet (40 mg total) by mouth nightly. 90 tablet 3    omeprazole 20 MG Oral Capsule Delayed Release Take 1 capsule (20 mg total) by mouth daily. 90 capsule 3    Multiple Vitamins-Minerals (PRESERVISION AREDS) Oral Tab Take 2 tablets by mouth daily.      traMADol 50 MG Oral Tab Take 1 tablet (50 mg total) by mouth. (Patient not taking: Reported on 9/24/2024)      Sildenafil Citrate 25 MG Oral Tab Take 1 tablet (25 mg total) by mouth daily as needed for Erectile Dysfunction. 30 tablet 0    triamcinolone 0.1 % External Cream Apply 1 Application topically 2 (two) times daily. (Patient not taking: Reported on 9/24/2024) 80 g 1    albuterol 108 (90 Base) MCG/ACT Inhalation Aero Soln Inhale 2 puffs into the lungs every 6 (six) hours as needed for Wheezing. (Patient not taking: Reported on 9/24/2024) 18 g 10    BREO ELLIPTA 100-25 MCG/ACT Inhalation Aerosol Powder, Breath Activated TAKE 1 PUFF BY MOUTH EVERY DAY 60 each 0    tiotropium (SPIRIVA HANDIHALER) 18 MCG Inhalation  Cap Inhale 1 capsule (18 mcg total) into the lungs daily. (Patient not taking: Reported on 2024) 90 capsule 0      Past Medical History:    Actinic keratosis    Allergic rhinitis, cause unspecified    ANXIETY    Anxiety    Anxiety state, unspecified    BACK PAIN    Basal cell carcinoma    Depression    Donor of unspecified organ or tissue    Encounter for long-term (current) use of other medications    External hemorrhoids without mention of complication    Foot and toe(s), superficial foreign body (splinter), without major open wound and without mention of infection    High blood pressure    High cholesterol    HYPERTENSION    HYPERTRIGLYCERIDEMIA    Lumbar scoliosis    Melanoma (HCC)    , resected    Melanoma of skin, site unspecified    Psychosexual dysfunction with inhibited sexual excitement    Sciatica    Sinusitis    Spinal stenosis, lumbar region, without neurogenic claudication    Spondylolisthesis of lumbar region    Squamous cell carcinoma    Unspecified essential hypertension    Visual impairment    glasses      Past Surgical History:   Procedure Laterality Date    Back surgery  18    L5-S1 ALIF    Colonoscopy  2006    exstensive diverticulosis coli  Dr Nate Hernandez    Colonoscopy  6/23/15    Dr.David Hernandez rpt in 3 years     Colonoscopy  2018    Dr. Nate Hernandez repeat in 3 yrs    Other surgical history      resection to remove melanoma    Tonsillectomy           Social History     Socioeconomic History    Marital status:    Tobacco Use    Smoking status: Former     Current packs/day: 0.00     Average packs/day: 0.5 packs/day for 17.0 years (8.5 ttl pk-yrs)     Types: Cigarettes     Start date: 1970     Quit date: 1987     Years since quittin.7     Passive exposure: Past    Smokeless tobacco: Never   Vaping Use    Vaping status: Never Used   Substance and Sexual Activity    Alcohol use: Yes     Comment: daily--2 drinks    Drug use: No    Sexual  activity: Not Currently   Other Topics Concern    Caffeine Concern Yes    Exercise Yes         REVIEW OF SYSTEMS:   GENERAL: no change in appetite  SKIN: no rashes or abnormal skin lesions  HEENT: See HPI  LUNGS: See HPI  CARDIOVASCULAR: denies chest pain or palpitations   GI: denies N/V/C or abdominal pain      EXAM:   /60 (BP Location: Left arm)   Pulse 98   Temp 97.4 °F (36.3 °C) (Temporal)   Resp 18   Ht 5' 9\" (1.753 m)   Wt 200 lb (90.7 kg)   SpO2 95%   BMI 29.53 kg/m²   GENERAL: well developed, well nourished,in no apparent distress  SKIN: no rashes,no suspicious lesions  HEAD: atraumatic, normocephalic.  no tenderness on palpation of sinuses  EYES: conjunctiva clear, EOM intact  EARS: TM's without erythema, no bulging, no retraction,no fluid, bony landmarks visible  NOSE: Nostrils patent, clear nasal discharge, nasal mucosa mildly inflamed   THROAT: Oral mucosa pink, moist. Posterior pharynx is not erythematous. no exudates..    NECK: Supple, non-tender  LUNGS: clear to auscultation bilaterally, mildly diminished in bases, no wheezes or rhonchi. Breathing is non labored.  CARDIO: RRR without murmur  EXTREMITIES: no cyanosis, clubbing or edema  LYMPH:  no lymphadenopathy.        ASSESSMENT AND PLAN:   Eloy Sanchez is a 74 year old male who presents with upper respiratory symptoms that are consistent with    ASSESSMENT:   Encounter Diagnoses   Name Primary?    Encounter for laboratory testing for COVID-19 virus     Viral URI with cough Yes       PLAN: Meds as below.  Comfort care as described in Patient Instructions.  Covid swab sent.  ADvised ER or IC for any worsening or new symptoms.      Meds & Refills for this Visit:  Requested Prescriptions     Signed Prescriptions Disp Refills    albuterol 108 (90 Base) MCG/ACT Inhalation Aero Soln 1 each 0     Sig: Inhale 2 puffs into the lungs every 4 (four) hours as needed for Wheezing or Shortness of Breath.     Risks, benefits, and side effects of  medication explained and discussed.    The patient indicates understanding of these issues and agrees to the plan.  The patient is asked to f/u with PCP if sx's persist or worsen.  Patient Instructions   I recommend the 4 H's for inflammation:    1. Heat (warm mist from the shower or warm liquids such as tea)  2. Honey (mixed in your tea or by the spoonful [if you are not diabetic; over the age of 1 year]--take a spoonful 3 times a day and don't eat or drink anything for 15-20 minutes)  3. Humidity--cool mist in the bedroom at night  4. Hydration --at least 8 -10 glasses a day     Mucinex as needed  Albuterol every 6 hours  Go to IC or ER for any worsening symptoms

## 2024-09-25 LAB — SARS-COV-2 RNA RESP QL NAA+PROBE: NOT DETECTED

## 2024-10-24 ENCOUNTER — TELEPHONE (OUTPATIENT)
Dept: INTERNAL MEDICINE CLINIC | Facility: CLINIC | Age: 74
End: 2024-10-24

## 2024-10-24 NOTE — TELEPHONE ENCOUNTER
Prevnar 13 received 5/5/2017, pneumovax 23 5/30/2018    Dr. Li do you recommend getting prevnar 20? Pended if agreeable.

## 2024-12-06 ENCOUNTER — LAB ENCOUNTER (OUTPATIENT)
Dept: LAB | Age: 74
End: 2024-12-06
Attending: FAMILY MEDICINE
Payer: MEDICARE

## 2024-12-06 DIAGNOSIS — E78.00 PURE HYPERCHOLESTEROLEMIA: ICD-10-CM

## 2024-12-06 LAB
ALBUMIN SERPL-MCNC: 4.6 G/DL (ref 3.2–4.8)
ALBUMIN/GLOB SERPL: 2 {RATIO} (ref 1–2)
ALP LIVER SERPL-CCNC: 69 U/L
ALT SERPL-CCNC: 29 U/L
ANION GAP SERPL CALC-SCNC: 6 MMOL/L (ref 0–18)
AST SERPL-CCNC: 25 U/L (ref ?–34)
BILIRUB SERPL-MCNC: 0.7 MG/DL (ref 0.2–1.1)
BUN BLD-MCNC: 18 MG/DL (ref 9–23)
CALCIUM BLD-MCNC: 9.8 MG/DL (ref 8.7–10.4)
CHLORIDE SERPL-SCNC: 104 MMOL/L (ref 98–112)
CHOLEST SERPL-MCNC: 207 MG/DL (ref ?–200)
CO2 SERPL-SCNC: 31 MMOL/L (ref 21–32)
CREAT BLD-MCNC: 0.94 MG/DL
EGFRCR SERPLBLD CKD-EPI 2021: 86 ML/MIN/1.73M2 (ref 60–?)
FASTING PATIENT LIPID ANSWER: YES
FASTING STATUS PATIENT QL REPORTED: YES
GLOBULIN PLAS-MCNC: 2.3 G/DL (ref 2–3.5)
GLUCOSE BLD-MCNC: 105 MG/DL (ref 70–99)
HDLC SERPL-MCNC: 61 MG/DL (ref 40–59)
LDLC SERPL CALC-MCNC: 131 MG/DL (ref ?–100)
NONHDLC SERPL-MCNC: 146 MG/DL (ref ?–130)
OSMOLALITY SERPL CALC.SUM OF ELEC: 294 MOSM/KG (ref 275–295)
POTASSIUM SERPL-SCNC: 4.3 MMOL/L (ref 3.5–5.1)
PROT SERPL-MCNC: 6.9 G/DL (ref 5.7–8.2)
SODIUM SERPL-SCNC: 141 MMOL/L (ref 136–145)
TRIGL SERPL-MCNC: 84 MG/DL (ref 30–149)
VLDLC SERPL CALC-MCNC: 15 MG/DL (ref 0–30)

## 2024-12-06 PROCEDURE — 36415 COLL VENOUS BLD VENIPUNCTURE: CPT

## 2024-12-06 PROCEDURE — 80053 COMPREHEN METABOLIC PANEL: CPT

## 2024-12-06 PROCEDURE — 80061 LIPID PANEL: CPT

## 2024-12-10 ENCOUNTER — OFFICE VISIT (OUTPATIENT)
Dept: INTERNAL MEDICINE CLINIC | Facility: CLINIC | Age: 74
End: 2024-12-10
Payer: MEDICARE

## 2024-12-10 ENCOUNTER — OFFICE VISIT (OUTPATIENT)
Facility: LOCATION | Age: 74
End: 2024-12-10
Payer: MEDICARE

## 2024-12-10 VITALS
OXYGEN SATURATION: 96 % | WEIGHT: 218 LBS | DIASTOLIC BLOOD PRESSURE: 70 MMHG | BODY MASS INDEX: 32 KG/M2 | HEART RATE: 103 BPM | SYSTOLIC BLOOD PRESSURE: 126 MMHG | TEMPERATURE: 97 F

## 2024-12-10 VITALS
TEMPERATURE: 98 F | OXYGEN SATURATION: 98 % | SYSTOLIC BLOOD PRESSURE: 148 MMHG | WEIGHT: 212 LBS | BODY MASS INDEX: 31.4 KG/M2 | DIASTOLIC BLOOD PRESSURE: 85 MMHG | HEIGHT: 69 IN | HEART RATE: 97 BPM

## 2024-12-10 DIAGNOSIS — L29.9 PRURITUS: ICD-10-CM

## 2024-12-10 DIAGNOSIS — K42.9 UMBILICAL HERNIA WITHOUT OBSTRUCTION AND WITHOUT GANGRENE: ICD-10-CM

## 2024-12-10 DIAGNOSIS — M62.08 DIASTASIS RECTI: ICD-10-CM

## 2024-12-10 DIAGNOSIS — E78.00 PURE HYPERCHOLESTEROLEMIA: Primary | ICD-10-CM

## 2024-12-10 DIAGNOSIS — I10 ESSENTIAL HYPERTENSION, BENIGN: ICD-10-CM

## 2024-12-10 DIAGNOSIS — Z85.038 HISTORY OF COLON CANCER: ICD-10-CM

## 2024-12-10 DIAGNOSIS — L30.9 DERMATITIS: ICD-10-CM

## 2024-12-10 DIAGNOSIS — D12.6 ADENOMATOUS POLYP OF COLON, UNSPECIFIED PART OF COLON: ICD-10-CM

## 2024-12-10 DIAGNOSIS — Z85.038 HISTORY OF COLON CANCER: Primary | ICD-10-CM

## 2024-12-10 PROCEDURE — 99214 OFFICE O/P EST MOD 30 MIN: CPT | Performed by: FAMILY MEDICINE

## 2024-12-10 PROCEDURE — 1159F MED LIST DOCD IN RCRD: CPT | Performed by: FAMILY MEDICINE

## 2024-12-10 PROCEDURE — 1160F RVW MEDS BY RX/DR IN RCRD: CPT | Performed by: FAMILY MEDICINE

## 2024-12-10 PROCEDURE — 99202 OFFICE O/P NEW SF 15 MIN: CPT | Performed by: STUDENT IN AN ORGANIZED HEALTH CARE EDUCATION/TRAINING PROGRAM

## 2024-12-10 RX ORDER — SOD SULF/POT CHLORIDE/MAG SULF 1.479 G
TABLET ORAL
Qty: 24 TABLET | Refills: 0 | Status: SHIPPED | OUTPATIENT
Start: 2024-12-10

## 2024-12-10 NOTE — H&P
Patient ID: Mathieu Sanchez is a 74 year old male.    Chief Complaint   Patient presents with    New Patient     NP - colonoscopy, family hx of colon cancer(father), pt would like to talk about taking sutab, no symptoms       HPI: Eloy Sanchez is a 74 year old male presents to clinic for evaluation.  Patient was previously being seen by Dr. Hernandez for colon cancer screening.  His last colonoscopy was in 2021 where tubular adenoma and hyperplastic polyp were found.  Since then, patient denies any symptoms include constipation, diarrhea, hematochezia, melanotic or dark stool, mucus in the stool, or significant weight loss.  He has significant family history of colon cancer in his father who was diagnosed in his 70s and  at 75.  Patient denies any abdominal surgeries.    Today, patient also reports having hernia on his abdominal wall.  He denies any symptoms but notices a bulge when he exercises.    Workup: None      Past Medical History  Past Medical History:    Actinic keratosis    Allergic rhinitis, cause unspecified    ANXIETY    Anxiety    Anxiety state, unspecified    BACK PAIN    Basal cell carcinoma    Depression    Donor of unspecified organ or tissue    Encounter for long-term (current) use of other medications    External hemorrhoids without mention of complication    Foot and toe(s), superficial foreign body (splinter), without major open wound and without mention of infection    High blood pressure    High cholesterol    HYPERTENSION    HYPERTRIGLYCERIDEMIA    Lumbar scoliosis    Melanoma (HCC)    , resected    Melanoma of skin, site unspecified    Psychosexual dysfunction with inhibited sexual excitement    Sciatica    Sinusitis    Spinal stenosis, lumbar region, without neurogenic claudication    Spondylolisthesis of lumbar region    Squamous cell carcinoma    Unspecified essential hypertension    Visual impairment    glasses       Past Surgical History  Past Surgical History:    Procedure Laterality Date    Back surgery  5/31/16    L5-S1 ALIF    Colonoscopy  12/2002 4/18/2006    exstensive diverticulosis coli  Dr Nate Hernandez    Colonoscopy  6/23/15    Dr.David Hernandez rpt in 3 years     Colonoscopy  11/06/2018    Dr. Nate Hernandez repeat in 3 yrs    Other surgical history      resection to remove melanoma    Tonsillectomy         Medications  Current Outpatient Medications   Medication Sig Dispense Refill    Sodium Sulfate-Mag Sulfate-KCl (SUTAB) 9173-210-296 MG Oral Tab Starting at 4PM the night before your procedure open one bottle and take all 12 tablets with 16 ounces of water within 15-20 minutes. At 9PM open the second bottle and take all 12 tablets with another 16 ounces of water. 24 tablet 0    clobetasol 0.05 % External Cream Apply 1 Application topically 2 (two) times daily. Apply to affected areas on arms, back, legs, neck twice daily 60 g 2    mupirocin 2 % External Ointment Apply 1 Application topically 2 (two) times daily. Apply to legs and arm twice daily 22 g 1    valsartan 80 MG Oral Tab Take 1 tablet (80 mg total) by mouth daily. 90 tablet 3    escitalopram 10 MG Oral Tab Take 1 tablet (10 mg total) by mouth daily. 90 tablet 3    pravastatin 40 MG Oral Tab Take 1 tablet (40 mg total) by mouth nightly. 90 tablet 3    Sildenafil Citrate 25 MG Oral Tab Take 1 tablet (25 mg total) by mouth daily as needed for Erectile Dysfunction. 30 tablet 0    omeprazole 20 MG Oral Capsule Delayed Release Take 1 capsule (20 mg total) by mouth daily. 90 capsule 3    triamcinolone 0.1 % External Cream Apply 1 Application topically 2 (two) times daily. 80 g 1    BREO ELLIPTA 100-25 MCG/ACT Inhalation Aerosol Powder, Breath Activated TAKE 1 PUFF BY MOUTH EVERY DAY 60 each 0    Multiple Vitamins-Minerals (PRESERVISION AREDS) Oral Tab Take 2 tablets by mouth daily.      NON FORMULARY Claritin 24 hour      traMADol 50 MG Oral Tab Take 1 tablet (50 mg total) by mouth. (Patient not taking: Reported  on 9/24/2024)      albuterol 108 (90 Base) MCG/ACT Inhalation Aero Soln Inhale 2 puffs into the lungs every 6 (six) hours as needed for Wheezing. (Patient not taking: Reported on 9/24/2024) 18 g 10    tiotropium (SPIRIVA HANDIHALER) 18 MCG Inhalation Cap Inhale 1 capsule (18 mcg total) into the lungs daily. (Patient not taking: Reported on 6/11/2024) 90 capsule 0       Allergies  Allergies[1]    Social History  History   Smoking Status    Former    Types: Cigarettes   Smokeless Tobacco    Never     History   Alcohol Use    Yes     Comment: daily--2 drinks     History   Drug Use No       Family History  Family History   Problem Relation Age of Onset    Breast Cancer Sister     Cancer Father         colon    Cancer Mother     Heart Attack Mother        Review of Systems  Review of Systems   Constitutional:  Negative for unexpected weight change.   Respiratory: Negative.     Cardiovascular: Negative.    Gastrointestinal:  Negative for abdominal pain, blood in stool, constipation, diarrhea, nausea and vomiting.       Exam  Vitals:    12/10/24 0833   BP: 148/85   Pulse: 97   Temp: 97.8 °F (36.6 °C)     Physical Exam  Constitutional:       Appearance: Normal appearance.   Cardiovascular:      Rate and Rhythm: Normal rate.   Pulmonary:      Effort: Pulmonary effort is normal.   Abdominal:      General: There is no distension.      Palpations: Abdomen is soft.      Tenderness: There is no abdominal tenderness.      Hernia: A hernia is present.       Musculoskeletal:         General: Normal range of motion.   Skin:     General: Skin is warm and dry.   Neurological:      Mental Status: He is alert and oriented to person, place, and time.           Assessment/Plan  Assessment   Problem List Items Addressed This Visit          Gastrointestinal and Abdominal    Adenomatous polyp of colon    Relevant Medications    Sodium Sulfate-Mag Sulfate-KCl (SUTAB) 1488-636-482 MG Oral Tab    Umbilical hernia without obstruction and without  gangrene    Relevant Medications    Sodium Sulfate-Mag Sulfate-KCl (SUTAB) 8705-388-658 MG Oral Tab       Musculoskeletal and Injuries    Diastasis recti       Hematology and Neoplasia    History of colon cancer, in his father age 72 - Primary       Eloy Sanchez is a 74 year old male with personal history of colon polyps, family history of colon cancer in his dad, diastases recti, and umbilical hernia    On physical exam, the site of patient's complaint of hernia is diastases recti  I discussed the diagnosis of diastases recti with the patient  No surgical intervention at this time  Patient can do physical therapy or exercises to help reduce the bulge    Patient does have a small, not reducible umbilical hernia that is nontender to palpation  I recommend continued observation  If patient has any symptoms, he can follow-up as needed    Finally, patient has significant history of colon polyps as well as family history of colon cancer  He is of high risk for colon cancer  We will plan for colonoscopy, possible biopsy  Procedure explained to the patient  Risks including bleeding, pain, infection, missed polyp, poor bowel prep, perforation, and need for further intervention all discussed  Bowel prep previously discussed with the patient via office staff  All questions were answered    Patient can call my office for any questions or concerns      Kath Brumfield MD  General Surgery  [facility]     CC:  Garrison Li MD         [1]   Allergies  Allergen Reactions    Lipitor [Atorvastatin Calcium] MYALGIA

## 2024-12-10 NOTE — PROGRESS NOTES
Eloy Sanchez  3/1/1950    Chief Complaint   Patient presents with    Follow - Up     6 month f/u  + lab results        HPI:   Eloy Sanchez is a 74 year old male who presents for follow-up. He has overall been doing well.  He has been following with his dermatologist for chronic pruritus/dermatitis and has been using an oral antihistamine in addition to topical steroids and changing his moisturizer.  He did have evaluation today with general surgery to schedule his colonoscopy.  He has been consistent with his pravastatin dose but he is taking it in the morning.  He did not tolerate Lipitor previously.    Current Outpatient Medications   Medication Sig Dispense Refill    Sodium Sulfate-Mag Sulfate-KCl (SUTAB) 1448-618-503 MG Oral Tab Starting at 4PM the night before your procedure open one bottle and take all 12 tablets with 16 ounces of water within 15-20 minutes. At 9PM open the second bottle and take all 12 tablets with another 16 ounces of water. 24 tablet 0    NON FORMULARY Claritin 24 hour      clobetasol 0.05 % External Cream Apply 1 Application topically 2 (two) times daily. Apply to affected areas on arms, back, legs, neck twice daily 60 g 2    mupirocin 2 % External Ointment Apply 1 Application topically 2 (two) times daily. Apply to legs and arm twice daily 22 g 1    valsartan 80 MG Oral Tab Take 1 tablet (80 mg total) by mouth daily. 90 tablet 3    escitalopram 10 MG Oral Tab Take 1 tablet (10 mg total) by mouth daily. 90 tablet 3    pravastatin 40 MG Oral Tab Take 1 tablet (40 mg total) by mouth nightly. 90 tablet 3    Sildenafil Citrate 25 MG Oral Tab Take 1 tablet (25 mg total) by mouth daily as needed for Erectile Dysfunction. 30 tablet 0    omeprazole 20 MG Oral Capsule Delayed Release Take 1 capsule (20 mg total) by mouth daily. 90 capsule 3    triamcinolone 0.1 % External Cream Apply 1 Application topically 2 (two) times daily. 80 g 1    BREO ELLIPTA 100-25 MCG/ACT Inhalation Aerosol  Powder, Breath Activated TAKE 1 PUFF BY MOUTH EVERY DAY 60 each 0    Multiple Vitamins-Minerals (PRESERVISION AREDS) Oral Tab Take 2 tablets by mouth daily.      traMADol 50 MG Oral Tab Take 1 tablet (50 mg total) by mouth. (Patient not taking: Reported on 9/24/2024)      albuterol 108 (90 Base) MCG/ACT Inhalation Aero Soln Inhale 2 puffs into the lungs every 6 (six) hours as needed for Wheezing. (Patient not taking: Reported on 9/24/2024) 18 g 10    tiotropium (SPIRIVA HANDIHALER) 18 MCG Inhalation Cap Inhale 1 capsule (18 mcg total) into the lungs daily. (Patient not taking: Reported on 6/11/2024) 90 capsule 0      Allergies[1]   Past Medical History:    Actinic keratosis    Allergic rhinitis, cause unspecified    ANXIETY    Anxiety    Anxiety state, unspecified    BACK PAIN    Basal cell carcinoma    Depression    Donor of unspecified organ or tissue    Encounter for long-term (current) use of other medications    External hemorrhoids without mention of complication    Foot and toe(s), superficial foreign body (splinter), without major open wound and without mention of infection    High blood pressure    High cholesterol    HYPERTENSION    HYPERTRIGLYCERIDEMIA    Lumbar scoliosis    Melanoma (HCC)    2007, resected    Melanoma of skin, site unspecified    Psychosexual dysfunction with inhibited sexual excitement    Sciatica    Sinusitis    Spinal stenosis, lumbar region, without neurogenic claudication    Spondylolisthesis of lumbar region    Squamous cell carcinoma    Unspecified essential hypertension    Visual impairment    glasses      Patient Active Problem List   Diagnosis    Diverticulosis    Pure hypercholesterolemia    Essential hypertension, benign    Lumbar foraminal stenosis    History of colon cancer, in his father age 72    Anxiety    S/P lumbar fusion    Idiopathic scoliosis of lumbar region    Diastasis recti    Wheezing      Past Surgical History:   Procedure Laterality Date    Back surgery   16    L5-S1 ALIF    Colonoscopy  2006    exstensive diverticulosis coli  Dr Nate Hernandez    Colonoscopy  6/23/15    Dr.David Hernandez rpt in 3 years     Colonoscopy  2018    Dr. Nate Hernandez repeat in 3 yrs    Other surgical history      resection to remove melanoma    Tonsillectomy        Family History   Problem Relation Age of Onset    Breast Cancer Sister     Cancer Father         colon    Cancer Mother     Heart Attack Mother       Social History     Socioeconomic History    Marital status:    Tobacco Use    Smoking status: Former     Current packs/day: 0.00     Average packs/day: 0.5 packs/day for 17.0 years (8.5 ttl pk-yrs)     Types: Cigarettes     Start date: 1970     Quit date: 1987     Years since quittin.9     Passive exposure: Past    Smokeless tobacco: Never   Vaping Use    Vaping status: Never Used   Substance and Sexual Activity    Alcohol use: Yes     Comment: daily--2 drinks    Drug use: No    Sexual activity: Not Currently   Other Topics Concern    Caffeine Concern Yes    Exercise Yes         REVIEW OF SYSTEMS:   GENERAL: feels well otherwise, no recent illness.     EXAM:   /70 (BP Location: Right arm, Patient Position: Sitting, Cuff Size: large)   Pulse 103   Temp 97.3 °F (36.3 °C) (Temporal)   Wt 218 lb (98.9 kg)   SpO2 96%   BMI 32.19 kg/m²   GENERAL: Well developed, well nourished,in no apparent distress  LUNGS: clear to auscultation  CARDIO: RRR without murmur    ASSESSMENT AND PLAN:   Eloy Sanchez is a 74 year old male who presents for follow-up    Pure hypercholesterolemia  LDL not at goal.  Will switch his pravastatin to nightly use and recheck at his CPE.  If LDL still elevated, will switch to rosuvastatin.    Essential hypertension, benign  Controlled.     History of colon cancer, in his father age 72  Had evaluation with general surgery today and has colonoscopy scheduled for early next year.    Pruritus/Dermatitis  Following with  dermatology.  Will continue to optimize his skin moisturizer and reduce hot showers.  Continue antihistamine and as needed topical corticosteroids.      All questions were answered and the patient agrees with the plan.     Thank you,  Garrison Li MD         [1]   Allergies  Allergen Reactions    Lipitor [Atorvastatin Calcium] MYALGIA

## 2025-02-17 ENCOUNTER — TELEPHONE (OUTPATIENT)
Facility: LOCATION | Age: 75
End: 2025-02-17

## 2025-02-17 NOTE — TELEPHONE ENCOUNTER
Eloy Sanchez Patient  Member ID  [id number]    Date of Birth  1950-03-01    Gender  Male    Eligibility Status  Active Coverage    Group Number  [id number]    Plan / Coverage Date  2022-01-01    Transaction Type  Outpatient Authorization    Organization  [facility]    Payer  [employer]ET[employer] (COMMERCIAL & MEDICARE)    [employer]etna logo  Transaction ID: Not FoundCustomer ID: [id number] Date: NA  No Authorization Required  [facility] Service  [facility]    Service From - To Date  NA    Admission Type  9    Diagnosis Code 1  D126 - Benign neoplasm of colon unspecified    Diagnosis Code 2  U31418 - Personal history of malignant neoplasm of large intestine    Procedure Code 1  43771    Quantity  1 Units    Procedure From - To Date  2025-03-05    Status  NO AUTH REQUIRED    Message  PRECERT IS NOT REQUIRED OR ONLY REQUIRED IN UNIQUE CIRCUMSTANCES FOR MEDICAID REFER TO PRIOR AUTH TOOL ON [employer] FOR ALL OTHERS REFER TO CODE SEARCH TOOL ON [employer] COVERAGE OF SERVICES ARE SUBJECT TO BENEFITS AND ELIGIBILITY

## 2025-02-19 ENCOUNTER — TELEPHONE (OUTPATIENT)
Facility: LOCATION | Age: 75
End: 2025-02-19

## 2025-02-19 RX ORDER — SOD SULF/POT CHLORIDE/MAG SULF 1.479 G
TABLET ORAL
Qty: 1 TABLET | Status: SHIPPED | OUTPATIENT
Start: 2025-02-19

## 2025-02-19 NOTE — TELEPHONE ENCOUNTER
Patient calling with a few questions.   Says that he was told that he could take the sutab. I informed him that we sent it back in December, but he says that that pharmacy closed. Needs new sutab sent to the [facility] in [place].   When reading about what he can and cannot drink the day before the test, it does say that he can drink tea and cranberry juice, but not coffee. He is wondering about that because there's a note \"no red\" under the cranberry juice. He is also wondering if he can drink black coffee, thought he could.    Please advise  Best callback number is [phone]    3/5 colonoscopy with Dr. Brumfield at [facility]

## 2025-02-19 NOTE — TELEPHONE ENCOUNTER
Spoke with patient and advised may drink cranberry juice but avoid red jello and coffee.  Patient advised may drink tea but not to add anything to eat.  Patient advised all liquids must be clear enough to read a newspaper through it.  Patient verbalized understanding.

## 2025-02-24 ENCOUNTER — TELEPHONE (OUTPATIENT)
Facility: LOCATION | Age: 75
End: 2025-02-24

## 2025-02-24 RX ORDER — SODIUM, POTASSIUM,MAG SULFATES 17.5-3.13G
SOLUTION, RECONSTITUTED, ORAL ORAL
Qty: 1 KIT | Refills: 0 | Status: SHIPPED | OUTPATIENT
Start: 2025-02-24

## 2025-02-24 NOTE — TELEPHONE ENCOUNTER
Fax received from pharmacy stating that patient's copay for Sutab would be $180.00.  Spoke with patient who requested a different prescription be sent.  Reviewed Suprep instructions with patient and he is agreeable to try that prep.  New prescription sent to pharmacy and  instructions sent via [facility].  All questions answered.

## 2025-03-04 ENCOUNTER — TELEPHONE (OUTPATIENT)
Facility: LOCATION | Age: 75
End: 2025-03-04

## 2025-03-05 ENCOUNTER — LAB REQUISITION (OUTPATIENT)
Age: 75
End: 2025-03-05
Payer: MEDICARE

## 2025-03-05 ENCOUNTER — TELEPHONE (OUTPATIENT)
Facility: LOCATION | Age: 75
End: 2025-03-05

## 2025-03-05 ENCOUNTER — PROCEDURE VISIT (OUTPATIENT)
Facility: LOCATION | Age: 75
End: 2025-03-05

## 2025-03-05 DIAGNOSIS — Z86.0100 HISTORY OF COLON POLYPS: ICD-10-CM

## 2025-03-05 DIAGNOSIS — D12.6 ADENOMATOUS POLYP OF COLON, UNSPECIFIED PART OF COLON: ICD-10-CM

## 2025-03-05 DIAGNOSIS — Z85.038 HISTORY OF COLON CANCER: Primary | ICD-10-CM

## 2025-03-05 PROCEDURE — 45380 COLONOSCOPY AND BIOPSY: CPT | Performed by: STUDENT IN AN ORGANIZED HEALTH CARE EDUCATION/TRAINING PROGRAM

## 2025-03-05 PROCEDURE — 1160F RVW MEDS BY RX/DR IN RCRD: CPT | Performed by: STUDENT IN AN ORGANIZED HEALTH CARE EDUCATION/TRAINING PROGRAM

## 2025-03-05 PROCEDURE — 88305 TISSUE EXAM BY PATHOLOGIST: CPT | Performed by: STUDENT IN AN ORGANIZED HEALTH CARE EDUCATION/TRAINING PROGRAM

## 2025-03-05 PROCEDURE — 45385 COLONOSCOPY W/LESION REMOVAL: CPT | Performed by: STUDENT IN AN ORGANIZED HEALTH CARE EDUCATION/TRAINING PROGRAM

## 2025-03-05 PROCEDURE — 1159F MED LIST DOCD IN RCRD: CPT | Performed by: STUDENT IN AN ORGANIZED HEALTH CARE EDUCATION/TRAINING PROGRAM

## 2025-03-05 NOTE — TELEPHONE ENCOUNTER
Patient underwent a colonoscopy with Dr. Brumfield at [facility] this morning.  Patient's wife called the answering service because the patient felt hot and she took his temperature.  He had a fever around 100.9.  Patient took Tylenol and was still noted to be febrile around 100.  He denies abdominal pain or rectal pain.    I do not have a copy of the procedure report but I did review things with Dr. Brumfield.  She removed 4-5 polyps, 1 in the cecum and the rest in the rectum.  She did use a hot snare.  No obvious clinical concern for perforation or aspiration.    I recommend that patient and his wife continue to monitor for fever or any worsening symptoms.  If the patient has persistent fevers of 101 or higher, I recommend going to the emergency room tonight for evaluation.  I also recommend the patient presents to the emergency room if he develops abdominal pain, rectal pain or any other new symptoms.  Patient and wife expressed understanding and were agreeable to plan.

## 2025-03-11 ENCOUNTER — TELEPHONE (OUTPATIENT)
Facility: LOCATION | Age: 75
End: 2025-03-11

## 2025-03-11 NOTE — TELEPHONE ENCOUNTER
----- Message from Kath Brumfield sent at 3/10/2025  8:02 AM CDT -----  Results reviewed.  Recall patient for colonoscopy in 3 years.  -----------    Recall placed, and Care Gaps updated.

## 2025-04-03 ENCOUNTER — OFFICE VISIT (OUTPATIENT)
Dept: INTERNAL MEDICINE CLINIC | Facility: CLINIC | Age: 75
End: 2025-04-03
Payer: MEDICARE

## 2025-04-03 VITALS
HEART RATE: 80 BPM | BODY MASS INDEX: 33.76 KG/M2 | RESPIRATION RATE: 18 BRPM | WEIGHT: 220.19 LBS | HEIGHT: 67.72 IN | OXYGEN SATURATION: 96 % | TEMPERATURE: 97 F | DIASTOLIC BLOOD PRESSURE: 80 MMHG | SYSTOLIC BLOOD PRESSURE: 136 MMHG

## 2025-04-03 DIAGNOSIS — I10 ESSENTIAL HYPERTENSION, BENIGN: ICD-10-CM

## 2025-04-03 DIAGNOSIS — M48.061 LUMBAR FORAMINAL STENOSIS: ICD-10-CM

## 2025-04-03 DIAGNOSIS — F41.9 ANXIETY: ICD-10-CM

## 2025-04-03 DIAGNOSIS — Z86.0100 HISTORY OF COLON POLYPS: ICD-10-CM

## 2025-04-03 DIAGNOSIS — Z98.1 S/P LUMBAR FUSION: ICD-10-CM

## 2025-04-03 DIAGNOSIS — H25.13 AGE-RELATED NUCLEAR CATARACT OF BOTH EYES: ICD-10-CM

## 2025-04-03 DIAGNOSIS — E78.00 PURE HYPERCHOLESTEROLEMIA: ICD-10-CM

## 2025-04-03 DIAGNOSIS — Z01.818 PREOPERATIVE EXAMINATION: Primary | ICD-10-CM

## 2025-04-03 PROCEDURE — 99213 OFFICE O/P EST LOW 20 MIN: CPT | Performed by: FAMILY MEDICINE

## 2025-04-03 PROCEDURE — 1159F MED LIST DOCD IN RCRD: CPT | Performed by: FAMILY MEDICINE

## 2025-04-03 PROCEDURE — 1160F RVW MEDS BY RX/DR IN RCRD: CPT | Performed by: FAMILY MEDICINE

## 2025-04-03 NOTE — PROGRESS NOTES
Eloy Sanchez  3/1/1950    Chief Complaint   Patient presents with    Pre-Op Exam     Here for pre-op exam having bilateral cataract extraction with Dr. Doyle at [facility] on 4/8/25 R eye, L eye 4/22/25.       HPI:   Eloy Sanchez is a 75 year old male who presents for preoperative exam prior to his bilateral cataract extractions. He overall feels well with no new CP or dyspnea. .    Current Outpatient Medications   Medication Sig Dispense Refill    Na Sulfate-K Sulfate-Mg Sulf (SUPREP BOWEL PREP KIT) 17.5-3.13-1.6 GM/177ML Oral Solution Take bowel preparation as provided by Gastroenterology office,or visit our website at [email or web address] kit 0    Sodium Sulfate-Mag Sulfate-KCl (SUTAB) 1292-604-998 MG Oral Tab Starting at 4PM the night before your procedure open one bottle and take all 12 tablets with 16 ounces of water within 15-20 minutes. At 9PM open the second bottle and take all 12 tablets with another 16 ounces of water. 1 tablet KIT    Sodium Sulfate-Mag Sulfate-KCl (SUTAB) 4523-658-813 MG Oral Tab Starting at 4PM the night before your procedure open one bottle and take all 12 tablets with 16 ounces of water within 15-20 minutes. At 9PM open the second bottle and take all 12 tablets with another 16 ounces of water. 24 tablet 0    NON FORMULARY Claritin 24 hour      clobetasol 0.05 % External Cream Apply 1 Application topically 2 (two) times daily. Apply to affected areas on arms, back, legs, neck twice daily 60 g 2    mupirocin 2 % External Ointment Apply 1 Application topically 2 (two) times daily. Apply to legs and arm twice daily 22 g 1    valsartan 80 MG Oral Tab Take 1 tablet (80 mg total) by mouth daily. 90 tablet 3    escitalopram 10 MG Oral Tab Take 1 tablet (10 mg total) by mouth daily. 90 tablet 3    pravastatin 40 MG Oral Tab Take 1 tablet (40 mg total) by mouth nightly. 90 tablet 3    Sildenafil Citrate 25 MG Oral Tab  Take 1 tablet (25 mg total) by mouth daily as needed for Erectile Dysfunction. 30 tablet 0    omeprazole 20 MG Oral Capsule Delayed Release Take 1 capsule (20 mg total) by mouth daily. 90 capsule 3    triamcinolone 0.1 % External Cream Apply 1 Application topically 2 (two) times daily. 80 g 1    Multiple Vitamins-Minerals (PRESERVISION AREDS) Oral Tab Take 2 tablets by mouth daily.        Allergies[1]   Past Medical History:    Actinic keratosis    Allergic rhinitis, cause unspecified    ANXIETY    Anxiety    Anxiety state, unspecified    BACK PAIN    Basal cell carcinoma    Depression    Donor of unspecified organ or tissue    Encounter for long-term (current) use of other medications    External hemorrhoids without mention of complication    Foot and toe(s), superficial foreign body (splinter), without major open wound and without mention of infection    High blood pressure    High cholesterol    HYPERTENSION    HYPERTRIGLYCERIDEMIA    Lumbar scoliosis    Melanoma (HCC)    2007, resected    Melanoma of skin, site unspecified    Psychosexual dysfunction with inhibited sexual excitement    Sciatica    Sinusitis    Spinal stenosis, lumbar region, without neurogenic claudication    Spondylolisthesis of lumbar region    Squamous cell carcinoma    Unspecified essential hypertension    Visual impairment    glasses      Patient Active Problem List   Diagnosis    Diverticulosis    Pure hypercholesterolemia    Essential hypertension, benign    Lumbar foraminal stenosis    History of colon cancer, in his father age 72    Anxiety    S/P lumbar fusion    Idiopathic scoliosis of lumbar region    Diastasis recti    Wheezing    Adenomatous polyp of colon    Umbilical hernia without obstruction and without gangrene    History of colon polyps      Past Surgical History:   Procedure Laterality Date    Back surgery  5/31/16    L5-S1 ALIF    Colonoscopy  12/2002 4/18/2006    exstensive diverticulosis coli  Dr Nate Hernandez     Colonoscopy  6/23/15    Dr.David Hernandez rpt in 3 years     Colonoscopy  2018    Dr. Nate Hernandez repeat in 3 yrs    Other surgical history      resection to remove melanoma    Tonsillectomy        Family History   Problem Relation Age of Onset    Breast Cancer Sister     Cancer Father         colon    Cancer Mother     Heart Attack Mother       Social History     Socioeconomic History    Marital status:    Tobacco Use    Smoking status: Former     Current packs/day: 0.00     Average packs/day: 0.5 packs/day for 17.0 years (8.5 ttl pk-yrs)     Types: Cigarettes     Start date: 1970     Quit date: 1987     Years since quittin.2     Passive exposure: Past    Smokeless tobacco: Never   Vaping Use    Vaping status: Never Used   Substance and Sexual Activity    Alcohol use: Yes     Comment: daily--2 drinks    Drug use: No    Sexual activity: Not Currently   Other Topics Concern    Caffeine Concern Yes    Exercise Yes         REVIEW OF SYSTEMS:   GENERAL: feels well otherwise  HEENT: not congested  LUNGS: no new dyspnea  CARDIOVASCULAR: no new chest pain  GI: no new abdominal pain    EXAM:   /80   Pulse 80   Temp 97.2 °F (36.2 °C) (Temporal)   Resp 18   Ht 5' 7.72\" (1.72 m)   Wt 220 lb 3.2 oz (99.9 kg)   SpO2 96%   BMI 33.76 kg/m²   GENERAL: Well developed, well nourished,in no apparent distress  SKIN: No rash  EYES: PERRLA, EOMI, conjunctiva are clear  HEENT: atraumatic, normocephalic,ears and throat are clear  NECK: supple,no adenopathy,no bruits  LUNGS: clear to auscultation  CARDIO: RRR without murmur  GI: good BS's,no masses, HSM or tenderness    ASSESSMENT AND PLAN:   Eloy Sanchez is a 75 year old male who presents for preoperative examination prior to his bilateral cataract extraction with intraocular lens implants scheduled for  and .  He has no new chest pain or dyspnea and great functional capacity.  He is overall in his stable state of health.  He may proceed  with his upcoming procedures at acceptable medical risk    1. Preoperative examination  2. Age-related nuclear cataract of both eyes  3. Pure hypercholesterolemia  4. Essential hypertension, benign  5. Lumbar foraminal stenosis  6. S/P lumbar fusion  7. Anxiety  8. History of colon polyps    All questions were answered and the patient agrees with the plan.     Thank you,  Garrison Li MD         [1]   Allergies  Allergen Reactions    Lipitor [Atorvastatin Calcium] MYALGIA

## 2025-04-04 ENCOUNTER — TELEPHONE (OUTPATIENT)
Dept: INTERNAL MEDICINE CLINIC | Facility: CLINIC | Age: 75
End: 2025-04-04

## 2025-04-25 DIAGNOSIS — R09.89 THROAT CLEARING: ICD-10-CM

## 2025-04-29 RX ORDER — OMEPRAZOLE 20 MG/1
20 CAPSULE, DELAYED RELEASE ORAL DAILY
Qty: 90 CAPSULE | Refills: 3 | Status: SHIPPED | OUTPATIENT
Start: 2025-04-29

## 2025-04-29 NOTE — TELEPHONE ENCOUNTER
Refill passes per [facility] protocol.    Future Appointments   Date Time Provider Department Center   6/12/2025  1:00 PM Garrison Li MD [facility]

## 2025-06-04 ENCOUNTER — TELEPHONE (OUTPATIENT)
Age: 75
End: 2025-06-04

## 2025-06-04 DIAGNOSIS — Z13.1 SCREENING FOR DIABETES MELLITUS: ICD-10-CM

## 2025-06-04 DIAGNOSIS — Z00.00 ROUTINE GENERAL MEDICAL EXAMINATION AT A HEALTH CARE FACILITY: ICD-10-CM

## 2025-06-04 DIAGNOSIS — Z13.220 SCREENING FOR LIPID DISORDERS: Primary | ICD-10-CM

## 2025-06-04 DIAGNOSIS — Z12.5 SCREENING FOR MALIGNANT NEOPLASM OF PROSTATE: ICD-10-CM

## 2025-06-04 DIAGNOSIS — Z13.0 SCREENING FOR DEFICIENCY ANEMIA: ICD-10-CM

## 2025-06-04 NOTE — TELEPHONE ENCOUNTER
Annual physical labs order and signed   Active lab orders placed per [facility] protocol      Last annual physical 6/11/2024    Next annual physical 6/12/2025 at 1pm

## 2025-06-04 NOTE — TELEPHONE ENCOUNTER
Future Appointments   Date Time Provider Department Center   6/12/2025  1:00 PM Garrison Li MD [facility]     Orders to edward     Pt informed that labs need to be completed no sooner than 2 weeks prior to the appt. Pt aware to fast-no call back required

## 2025-06-06 DIAGNOSIS — E78.00 PURE HYPERCHOLESTEROLEMIA: ICD-10-CM

## 2025-06-09 RX ORDER — PRAVASTATIN SODIUM 40 MG
40 TABLET ORAL NIGHTLY
Qty: 90 TABLET | Refills: 3 | Status: SHIPPED | OUTPATIENT
Start: 2025-06-09

## 2025-06-09 NOTE — TELEPHONE ENCOUNTER
REFILL PASSED PER [facility] PROTOCOLS    Future Appointments   Date Time Provider Department Center   6/12/2025  1:00 PM Garrison Li MD [facility]   6/25/2025  7:15 AM Twan Padilla MD [facility]   7/14/2025  1:45 PM Twan Padilla MD [facility]          Please review pended refill request as unable to refill due to high/very high drug interaction warning copied here;            High  Allergy/Contraindication: pravastatinReactions: MYALGIA. No reaction type specified. User documented allergy severity: Medium.  Level 2 with ATORVASTATIN CALCIUM (Class: STATINS).  Details

## 2025-06-10 ENCOUNTER — LAB ENCOUNTER (OUTPATIENT)
Dept: LAB | Age: 75
End: 2025-06-10
Attending: FAMILY MEDICINE
Payer: MEDICARE

## 2025-06-10 DIAGNOSIS — Z13.1 SCREENING FOR DIABETES MELLITUS: ICD-10-CM

## 2025-06-10 DIAGNOSIS — E87.1 LOW SODIUM LEVELS: ICD-10-CM

## 2025-06-10 DIAGNOSIS — Z12.5 SCREENING FOR MALIGNANT NEOPLASM OF PROSTATE: ICD-10-CM

## 2025-06-10 DIAGNOSIS — Z00.00 ROUTINE GENERAL MEDICAL EXAMINATION AT A HEALTH CARE FACILITY: ICD-10-CM

## 2025-06-10 DIAGNOSIS — Z13.220 SCREENING FOR LIPID DISORDERS: ICD-10-CM

## 2025-06-10 DIAGNOSIS — Z13.0 SCREENING FOR DEFICIENCY ANEMIA: ICD-10-CM

## 2025-06-10 LAB
ALBUMIN SERPL-MCNC: 5.1 G/DL (ref 3.2–4.8)
ALBUMIN/GLOB SERPL: 2.3 {RATIO} (ref 1–2)
ALP LIVER SERPL-CCNC: 72 U/L (ref 45–117)
ALT SERPL-CCNC: 22 U/L (ref 10–49)
ANION GAP SERPL CALC-SCNC: 11 MMOL/L (ref 0–18)
AST SERPL-CCNC: 22 U/L (ref ?–34)
BASOPHILS # BLD AUTO: 0.05 X10(3) UL (ref 0–0.2)
BASOPHILS NFR BLD AUTO: 0.8 %
BILIRUB SERPL-MCNC: 0.6 MG/DL (ref 0.2–1.1)
BUN BLD-MCNC: 15 MG/DL (ref 9–23)
CALCIUM BLD-MCNC: 9.9 MG/DL (ref 8.7–10.6)
CHLORIDE SERPL-SCNC: 101 MMOL/L (ref 98–112)
CHOLEST SERPL-MCNC: 205 MG/DL (ref ?–200)
CO2 SERPL-SCNC: 30 MMOL/L (ref 21–32)
COMPLEXED PSA SERPL-MCNC: 0.99 NG/ML (ref ?–4)
CREAT BLD-MCNC: 1.07 MG/DL (ref 0.7–1.3)
EGFRCR SERPLBLD CKD-EPI 2021: 73 ML/MIN/1.73M2 (ref 60–?)
EOSINOPHIL # BLD AUTO: 0.23 X10(3) UL (ref 0–0.7)
EOSINOPHIL NFR BLD AUTO: 3.8 %
ERYTHROCYTE [DISTWIDTH] IN BLOOD BY AUTOMATED COUNT: 11.9 %
FASTING PATIENT LIPID ANSWER: YES
FASTING STATUS PATIENT QL REPORTED: YES
GLOBULIN PLAS-MCNC: 2.2 G/DL (ref 2–3.5)
GLUCOSE BLD-MCNC: 110 MG/DL (ref 70–99)
HCT VFR BLD AUTO: 45.3 % (ref 39–53)
HDLC SERPL-MCNC: 62 MG/DL (ref 40–59)
HGB BLD-MCNC: 15.5 G/DL (ref 13–17.5)
IMM GRANULOCYTES # BLD AUTO: 0.01 X10(3) UL (ref 0–1)
IMM GRANULOCYTES NFR BLD: 0.2 %
LDLC SERPL CALC-MCNC: 123 MG/DL (ref ?–100)
LYMPHOCYTES # BLD AUTO: 2.17 X10(3) UL (ref 1–4)
LYMPHOCYTES NFR BLD AUTO: 35.4 %
MCH RBC QN AUTO: 32.8 PG (ref 26–34)
MCHC RBC AUTO-ENTMCNC: 34.2 G/DL (ref 31–37)
MCV RBC AUTO: 96 FL (ref 80–100)
MONOCYTES # BLD AUTO: 0.57 X10(3) UL (ref 0.1–1)
MONOCYTES NFR BLD AUTO: 9.3 %
NEUTROPHILS # BLD AUTO: 3.1 X10 (3) UL (ref 1.5–7.7)
NEUTROPHILS # BLD AUTO: 3.1 X10(3) UL (ref 1.5–7.7)
NEUTROPHILS NFR BLD AUTO: 50.5 %
NONHDLC SERPL-MCNC: 143 MG/DL (ref ?–130)
OSMOLALITY SERPL CALC.SUM OF ELEC: 295 MOSM/KG (ref 275–295)
PLATELET # BLD AUTO: 223 10(3)UL (ref 150–450)
POTASSIUM SERPL-SCNC: 4.6 MMOL/L (ref 3.5–5.1)
PROT SERPL-MCNC: 7.3 G/DL (ref 5.7–8.2)
RBC # BLD AUTO: 4.72 X10(6)UL (ref 3.8–5.8)
SODIUM SERPL-SCNC: 142 MMOL/L (ref 136–145)
TRIGL SERPL-MCNC: 112 MG/DL (ref 30–149)
VLDLC SERPL CALC-MCNC: 20 MG/DL (ref 0–30)
WBC # BLD AUTO: 6.1 X10(3) UL (ref 4–11)

## 2025-06-10 PROCEDURE — 80053 COMPREHEN METABOLIC PANEL: CPT

## 2025-06-10 PROCEDURE — 80061 LIPID PANEL: CPT

## 2025-06-10 PROCEDURE — 36415 COLL VENOUS BLD VENIPUNCTURE: CPT

## 2025-06-10 PROCEDURE — 85025 COMPLETE CBC W/AUTO DIFF WBC: CPT

## 2025-06-12 ENCOUNTER — OFFICE VISIT (OUTPATIENT)
Age: 75
End: 2025-06-12
Payer: MEDICARE

## 2025-06-12 ENCOUNTER — PATIENT MESSAGE (OUTPATIENT)
Age: 75
End: 2025-06-12

## 2025-06-12 VITALS
WEIGHT: 220.19 LBS | SYSTOLIC BLOOD PRESSURE: 136 MMHG | HEART RATE: 90 BPM | DIASTOLIC BLOOD PRESSURE: 68 MMHG | RESPIRATION RATE: 16 BRPM | BODY MASS INDEX: 33.76 KG/M2 | HEIGHT: 67.72 IN | OXYGEN SATURATION: 96 % | TEMPERATURE: 97 F

## 2025-06-12 DIAGNOSIS — Z00.00 ENCOUNTER FOR ANNUAL HEALTH EXAMINATION: Primary | ICD-10-CM

## 2025-06-12 DIAGNOSIS — N52.9 ERECTILE DYSFUNCTION, UNSPECIFIED ERECTILE DYSFUNCTION TYPE: ICD-10-CM

## 2025-06-12 DIAGNOSIS — F41.9 ANXIETY: ICD-10-CM

## 2025-06-12 DIAGNOSIS — M41.26 IDIOPATHIC SCOLIOSIS OF LUMBAR REGION: ICD-10-CM

## 2025-06-12 DIAGNOSIS — I10 ESSENTIAL HYPERTENSION, BENIGN: ICD-10-CM

## 2025-06-12 DIAGNOSIS — Z86.0100 HISTORY OF COLON POLYPS: ICD-10-CM

## 2025-06-12 DIAGNOSIS — E78.00 PURE HYPERCHOLESTEROLEMIA: ICD-10-CM

## 2025-06-12 DIAGNOSIS — Z85.038 HISTORY OF COLON CANCER: ICD-10-CM

## 2025-06-12 DIAGNOSIS — Z85.828 HISTORY OF SKIN CANCER: ICD-10-CM

## 2025-06-12 DIAGNOSIS — R73.01 ELEVATED FASTING GLUCOSE: ICD-10-CM

## 2025-06-12 PROBLEM — M65.332 TRIGGER FINGER, LEFT MIDDLE FINGER: Status: ACTIVE | Noted: 2024-08-21

## 2025-06-12 RX ORDER — SILDENAFIL 25 MG/1
25 TABLET, FILM COATED ORAL
Qty: 30 TABLET | Refills: 0 | Status: CANCELLED
Start: 2025-06-12

## 2025-06-12 RX ORDER — VALSARTAN 80 MG/1
80 TABLET ORAL DAILY
Qty: 90 TABLET | Refills: 3 | Status: SHIPPED | OUTPATIENT
Start: 2025-06-12

## 2025-06-13 RX ORDER — SILDENAFIL 25 MG/1
25 TABLET, FILM COATED ORAL
Qty: 30 TABLET | Refills: 0 | Status: SHIPPED | OUTPATIENT
Start: 2025-06-13

## 2025-06-13 NOTE — TELEPHONE ENCOUNTER
Patient is calling to check on the status. He stated he just saw Dr Garrison Li yesterday and this one wasn't sent.    Eloy Sanchez requesting Medication Refill for:    Medication name and dose (copy and paste from medication list)    Disp Refills Start End    Sildenafil Citrate 25 MG Oral Tab 30 tablet 0 6/11/2024 --    Sig - Route: Take 1 tablet (25 mg total) by mouth daily as needed for Erectile Dysfunction. - Oral        Preferred Pharmacy:   [id number] - [place] [id number] [place] [address] [phone], [phone]   [address] [place] [id number]   Phone: [phone] Fax: [phone]   Hours: Open 24 hours       LOV: @ENILASTOFFICE VISITWME@  Last Refill date:   Next Scheduled appointment:   Future Appointments   Date Time Provider Department Center   6/25/2025  7:15 AM Twan Padilla MD [facility]   7/14/2025  1:45 PM Twan Padilla MD [facility]

## 2025-06-13 NOTE — TELEPHONE ENCOUNTER
Outpatient Medication Detail     Disp Refills Start End    Sildenafil Citrate 25 MG Oral Tab 30 tablet 0 6/13/2025 --    Sig - Route: Take 1 tablet (25 mg total) by mouth daily as needed for Erectile Dysfunction. - Oral    Sent to pharmacy as: Sildenafil Citrate 25 MG Oral Tablet (VIAGRA)    E-Prescribing Status: Receipt confirmed by pharmacy (6/13/2025  2:37 PM CDT)      Associated Diagnoses    Erectile dysfunction, unspecified erectile dysfunction type        Pharmacy    [id number] - [place], [place] - [address] [place] [address] [phone], [phone]

## 2025-06-14 ENCOUNTER — OFFICE VISIT (OUTPATIENT)
Dept: FAMILY MEDICINE CLINIC | Facility: CLINIC | Age: 75
End: 2025-06-14
Payer: MEDICARE

## 2025-06-14 DIAGNOSIS — H61.23 BILATERAL IMPACTED CERUMEN: Primary | ICD-10-CM

## 2025-06-14 DIAGNOSIS — H92.03 OTALGIA, BILATERAL: ICD-10-CM

## 2025-06-14 PROCEDURE — 1160F RVW MEDS BY RX/DR IN RCRD: CPT | Performed by: NURSE PRACTITIONER

## 2025-06-14 PROCEDURE — 99213 OFFICE O/P EST LOW 20 MIN: CPT | Performed by: NURSE PRACTITIONER

## 2025-06-14 PROCEDURE — 1159F MED LIST DOCD IN RCRD: CPT | Performed by: NURSE PRACTITIONER

## 2025-06-14 NOTE — PROGRESS NOTES
Subjective:   Eloy Sanchez is a 75 year old male who presents for a MA AHA (Medicare Advantage Annual Health Assessment) and Subsequent Annual Wellness visit (Pt already had Initial Annual Wellness) and scheduled follow up of multiple significant but stable problems.             Overall doing well. Has been consistent with his current treatment plan. Following with dermatology and has upcoming excision planned for R arm SCC.     History/Other:   Fall Risk Assessment:   He has been screened for Falls and is low risk.      Cognitive Assessment:   He had a completely normal cognitive assessment - see flowsheet entries     Functional Ability/Status:   Eloy Sanchez has a completely normal functional assessment. See flowsheet for details.      Depression Screening (PHQ):  PHQ-2 SCORE: 0  , done 6/12/2025        <5 minutes spent screening and counseling for depression    Advanced Directives:   He does have a Living Will but we do NOT have it on file in Epic.    He does have a POA but we do NOT have it on file in Epic.    Not discussed      Patient Active Problem List   Diagnosis    Diverticulosis    Pure hypercholesterolemia    Essential hypertension, benign    Lumbar foraminal stenosis    History of colon cancer, in his father age 72    Anxiety    S/P lumbar fusion    Idiopathic scoliosis of lumbar region    Diastasis recti    Wheezing    Adenomatous polyp of colon    Umbilical hernia without obstruction and without gangrene    History of colon polyps    Trigger finger, left middle finger     Allergies:  He is allergic to lipitor [atorvastatin calcium].    Current Medications:  Outpatient Medications Marked as Taking for the 6/12/25 encounter (Office Visit) with Garrison Li MD   Medication Sig    Sildenafil Citrate 25 MG Oral Tab Take 1 tablet (25 mg total) by mouth daily as needed for Erectile Dysfunction.    Rosuvastatin Calcium 10 MG Oral Capsule Sprinkle Take 1 tablet by mouth at bedtime.    valsartan 80  MG Oral Tab Take 1 tablet (80 mg total) by mouth daily.    omeprazole 20 MG Oral Capsule Delayed Release Take 1 capsule (20 mg total) by mouth daily.    Cholecalciferol 125 MCG (5000 UT) Oral Tab Take 1 tablet by mouth in the morning.    Na Sulfate-K Sulfate-Mg Sulf (SUPREP BOWEL PREP KIT) 17.5-3.13-1.6 GM/177ML Oral Solution Take bowel preparation as provided by Gastroenterology office,or visit our website at [email or web address]    Sodium Sulfate-Mag Sulfate-KCl (SUTAB) 8983-117-187 MG Oral Tab Starting at 4PM the night before your procedure open one bottle and take all 12 tablets with 16 ounces of water within 15-20 minutes. At 9PM open the second bottle and take all 12 tablets with another 16 ounces of water.    NON FORMULARY Claritin 24 hour    clobetasol 0.05 % External Cream Apply 1 Application topically 2 (two) times daily. Apply to affected areas on arms, back, legs, neck twice daily    mupirocin 2 % External Ointment Apply 1 Application topically 2 (two) times daily. Apply to legs and arm twice daily    escitalopram 10 MG Oral Tab Take 1 tablet (10 mg total) by mouth daily.    triamcinolone 0.1 % External Cream Apply 1 Application topically 2 (two) times daily.    Multiple Vitamins-Minerals (PRESERVISION AREDS) Oral Tab Take 2 tablets by mouth in the morning.       Medical History:  He  has a past medical history of Actinic keratosis, Allergic rhinitis, cause unspecified (6/29/2012), ANXIETY, Anxiety (5/20/2016), Anxiety state, unspecified, BACK PAIN, Basal cell carcinoma, Depression, Donor of unspecified organ or tissue (6/29/2012), Encounter for long-term (current) use of other medications (6/29/2012), External hemorrhoids without mention of complication (6/29/2012), Foot and toe(s), superficial foreign body (splinter), without major open wound and without mention of infection, High blood pressure, High cholesterol, HYPERTENSION, HYPERTRIGLYCERIDEMIA,  Lumbar scoliosis (2014), Melanoma (HCC), Melanoma of skin, site unspecified (2012), Psychosexual dysfunction with inhibited sexual excitement, Sciatica (2012), Sinusitis (2013), Spinal stenosis, lumbar region, without neurogenic claudication (2009), Spondylolisthesis of lumbar region (2016), Squamous cell carcinoma, Unspecified essential hypertension, and Visual impairment.  Surgical History:  He  has a past surgical history that includes tonsillectomy; colonoscopy (2006); other surgical history; colonoscopy (6/23/15); back surgery (16); and colonoscopy (2018).   Family History:  His family history includes Breast Cancer in his sister; Cancer in his father and mother; Heart Attack in his mother.  Social History:  He  reports that he quit smoking about 38 years ago. His smoking use included cigarettes. He started smoking about 55 years ago. He has a 8.5 pack-year smoking history. He has been exposed to tobacco smoke. He has never used smokeless tobacco. He reports current alcohol use. He reports that he does not use drugs.    Tobacco:  He smoked tobacco in the past but quit greater than 12 months ago.  Social History     Tobacco Use   Smoking Status Former    Current packs/day: 0.00    Average packs/day: 0.5 packs/day for 17.0 years (8.5 ttl pk-yrs)    Types: Cigarettes    Start date: 1970    Quit date: 1987    Years since quittin.4    Passive exposure: Past   Smokeless Tobacco Never        CAGE Alcohol Screen:   CAGE screening score of 0 on 2025, showing low risk of alcohol abuse.      Patient Care Team:  Garrison Li MD as PCP - General (Family Medicine)  Lory Daugherty PT as Physical Therapist    Review of Systems     Negative except as in HPI    Objective:   Physical Exam    /68 (BP Location: Right arm)   Pulse 90   Temp 96.9 °F (36.1 °C) (Oral)   Resp 16   Ht 5' 7.72\" (1.72 m)   Wt 220 lb 3.2 oz (99.9 kg)   SpO2 96%   BMI  33.76 kg/m²  Estimated body mass index is 33.76 kg/m² as calculated from the following:    Height as of this encounter: 5' 7.72\" (1.72 m).    Weight as of this encounter: 220 lb 3.2 oz (99.9 kg).  GEN: well appearing, no distress  HEENT: PRICE, ears clear  CV: RRR, no murmur  CHEST: CTAB  ADB: soft, NT  EXT: no swelling    Medicare Hearing Assessment:   Hearing Screening    Time taken: 6/12/2025  1:05 PM  Screening Method: Whisper Test  Whisper Test Result: Pass         Visual Acuity:   Right Eye Visual Acuity: Uncorrected Right Eye Chart Acuity: 20/40   Left Eye Visual Acuity: Uncorrected Left Eye Chart Acuity: 20/40   Both Eyes Visual Acuity: Uncorrected Both Eyes Chart Acuity: 20/40   Able To Tolerate Visual Acuity: Yes        Assessment & Plan:   Eloy Sanchez is a 75 year old male who presents for a Medicare Assessment.     Encounter for annual health examination    History of skin cancer  Following with derm and has upcoming excision of R arm SCC planned    Pure hypercholesterolemia  LDL persistently above goal. Will switch statin to rosuvastatin and recheck lipid panel and CMP in 3 months.   - Rosuvastatin Calcium 10 MG Oral Capsule Sprinkle; Take 1 tablet by mouth at bedtime.  Dispense: 30 capsule; Refill: 0  - Lipid Panel; Future  - Comp Metabolic Panel (14); Future    Essential hypertension, benign  Overall controlled on current treatment.   - valsartan 80 MG Oral Tab; Take 1 tablet (80 mg total) by mouth daily.  Dispense: 90 tablet; Refill: 3    Elevated fasting glucose  Will work on dietary optimization. Repeat CMP with lipid panel in 3 months.   - Comp Metabolic Panel (14); Future    Erectile dysfunction, unspecified erectile dysfunction type  - Sildenafil Citrate 25 MG Oral Tab; Take 1 tablet (25 mg total) by mouth daily as needed for Erectile Dysfunction.  Dispense: 30 tablet; Refill: 0    Idiopathic scoliosis of thoracolumbar region  Stable    History of colon polyps  History of colon cancer, in his  father age 72  Colonoscopy UTD, recall March 2028    Anxiety  Stable on current treatment.       The patient indicates understanding of these issues and agrees to the plan.  Reinforced healthy diet, lifestyle, and exercise.      Garrison Li MD, 6/14/2025     Supplementary Documentation:   General Health:  In the past six months, have you lost more than 10 pounds without trying?: 2 - No  Has your appetite been poor?: No  Type of Diet: Balanced  How does the patient maintain a good energy level?: Appropriate Exercise, Stretching  How would you describe your current health state?: Good  How do you maintain positive mental well-being?: Social Interaction, Visiting Family, Games, Visiting Friends  On a scale of 0 to 10, with 0 being no pain and 10 being severe pain, what is your pain level?: 0 - (None)  In the past six months, have you experienced urine leakage?: 0-No  At any time do you feel concerned for the safety/well-being of yourself and/or your children, in your home or elsewhere?: No  Have you had any immunizations at another office such as Influenza, Hepatitis B, Tetanus, or Pneumococcal?: No    Health Maintenance   Topic Date Due    COVID-19 Vaccine (4 - 2024-25 season) 09/01/2024    Annual Well Visit  01/01/2025    PSA  06/10/2027    Colorectal Cancer Screening  03/05/2028    Influenza Vaccine  Completed    Annual Depression Screening  Completed    Fall Risk Screening (Annual)  Completed    Pneumococcal Vaccine: 50+ Years  Completed    Zoster Vaccines  Completed    Meningococcal B Vaccine  Aged Out

## 2025-06-17 DIAGNOSIS — E78.00 PURE HYPERCHOLESTEROLEMIA: ICD-10-CM

## 2025-06-17 NOTE — TELEPHONE ENCOUNTER
Rosuvastatin Calcium 10 MG Oral Capsule Sprinkle 30 capsule 0 6/12/2025 7/12/2025   Sig:   Take 1 tablet by mouth at bedtime.     Route:   Oral       Dr. Garrison Li sent in prescription for above meds to [facility] on 6/12/25 and I just spoke with the pharmacy and they did not receive it-please send in new rx

## 2025-06-17 NOTE — TELEPHONE ENCOUNTER
Provider Information    Authorizing Provider Encounter Provider   Garrison Li MD Kimes, Michael, MD     Medication Detail    Medication Quantity Refills Start End   Rosuvastatin Calcium 10 MG Oral Capsule Sprinkle 30 capsule 0 6/12/2025 7/12/2025   Sig:   Take 1 tablet by mouth at bedtime.     Route:   Oral     Order #:   [id number]       Additional Order Information    Multidose Package Dispensed: No Cost ID: -- Admin Qty: 1 tablet    NDC #: -- NDC Qty: --    Calculation: --     Pharmacy Actions and Admin    [facility] Pharmacy Actions     MAR Comment Waste Waste Unit          Outpatient Medication Detail     Disp Refills Start End    Rosuvastatin Calcium 10 MG Oral Capsule Sprinkle 30 capsule 0 6/12/2025 7/12/2025    Sig - Route: Take 1 tablet by mouth at bedtime. - Oral    Sent to pharmacy as: Rosuvastatin Calcium 10 MG Oral Capsule Sprinkle    E-Prescribing Status: Receipt confirmed by pharmacy (6/12/2025  1:27 PM CDT)      Associated Diagnoses    Pure hypercholesterolemia        Pharmacy    [id number] - [place], [place] - [address] [place] [address] [phone], [phone]     Additional Information    Associated Reports   View Encounter   Priority and Order Details     Outpatient Medication Detail     Disp Refills Start End    Rosuvastatin Calcium 10 MG Oral Capsule Sprinkle 30 capsule 0 6/12/2025 7/

## 2025-06-18 ENCOUNTER — TELEPHONE (OUTPATIENT)
Age: 75
End: 2025-06-18

## 2025-06-18 DIAGNOSIS — E78.00 PURE HYPERCHOLESTEROLEMIA: Primary | ICD-10-CM

## 2025-06-19 RX ORDER — ROSUVASTATIN CALCIUM 10 MG/1
10 TABLET, COATED ORAL NIGHTLY
Qty: 30 TABLET | Refills: 0 | Status: SHIPPED | OUTPATIENT
Start: 2025-06-19

## 2025-06-19 NOTE — TELEPHONE ENCOUNTER
Dr. Li- Was this supposed to be Rosuvastatin tablets? Pended tablets if so. Sig states \"Take 1 tablet by mouth at bedtime\"    Received call from [place] pharmacy to clarify a prescription. Stated we sent in Rosuvastatin capsules however sig says \"tablets'. Capsules required PA. Pharmacy tech Corey wondering if we meant to send in tablets instead of sprinkle capsules.       Medication Detail    Medication Quantity Refills Start End   Rosuvastatin Calcium 10 MG Oral Capsule Sprinkle 30 capsule 0 6/17/2025 7/17/2025   Sig:   Take 1 tablet by mouth at bedtime.     Route:   Oral     Order #:   [id number]

## 2025-06-19 NOTE — TELEPHONE ENCOUNTER
Called [place] pharmacy, spoke with Leslie. Notified we did mean to send in tablets and new prescription was sent in. Stated he does see new order.

## 2025-08-13 DIAGNOSIS — E78.00 PURE HYPERCHOLESTEROLEMIA: ICD-10-CM

## 2025-08-18 RX ORDER — ROSUVASTATIN CALCIUM 10 MG/1
10 TABLET, COATED ORAL NIGHTLY
Qty: 90 TABLET | Refills: 3 | Status: SHIPPED | OUTPATIENT
Start: 2025-08-18

## (undated) DIAGNOSIS — M25.519 SHOULDER PAIN, UNSPECIFIED CHRONICITY, UNSPECIFIED LATERALITY: Primary | ICD-10-CM

## (undated) NOTE — ED AVS SNAPSHOT
[facility]   MRN: [id number]    Department:  [address] in [place]   Date of Visit:  3/20/2018           Disclosure     Insurance plans vary and the physician(s) referred by the ER may not be covered by your plan.  Please conta tell this physician (or your personal doctor if your instructions are to return to your personal doctor) about any new or lasting problems. The primary care or specialist physician will see patients referred from the [facility] Emergency Department.  Andrei Vasquez

## (undated) NOTE — Clinical Note
I saw Chris Dawkins in the [facility] in Clinic today for viral uri,  he was treated with comfort care. Chris Dawkins will follow up with you if no better or as needed.  Thank you for the opportunity to care for [facility] Staff, [facility]

## (undated) NOTE — LETTER
AUTHORIZATION FOR SURGICAL OPERATION OR OTHER PROCEDURE    1.  I hereby authorize Dr. Torres Salmeron, and [address] [place] [address] staff assigned to my case to perform the following operation and/or procedure at the [address] [place] [address]:    ________________________________ Witness signature: ___________________________________________________ Date:  ______/______/_____                    Time:  ________ A. M.  P.M.        Patient Name:  ______________________________________________________  (please print)      Patient signatu

## (undated) NOTE — LETTER
12/10/24    Patient: Eloy Sanchez  : 3/1/1950 Visit date: 12/10/2024    Dear  Garrison Li MD    Thank you for referring Eloy Sanchez to my practice.  Please find my assessment and plan below.        Good morning.  Thank you for the referral.  I saw Mathieu in my clinic today for colon cancer screening.  He is of high risk with his personal history of colon polyps and family history of colon cancer in his dad.  We are planning for colonoscopy in March of next year.  The procedure and all the risks associated with it were discussed.  Mathieu acknowledged understanding and has agreed.  Thank you once again for the referral and please let me know if you have any questions.    Sincerely,       Kath Brumfield MD

## (undated) NOTE — MR AVS SNAPSHOT
[facility] [address] [phone]               Thank you for choosing us for your health care visit with Janki Mesa MD.  We are glad to serve you and happy to provide you with this summ escitalopram 10 MG Tabs   TAKE 1 TABLET (10 MG TOTAL) BY MOUTH ONCE DAILY. Commonly known as:  LEXAPRO           Losartan Potassium 25 MG Tabs   TAKE ONE TABLET BY MOUTH DAILY   What changed:  Another medication with the same name was removed.  Contin increments are effective and add up over the week   2 ½ hours per week – spread out over time Use a jaylin to keep you motivated   Don’t forget strength training with weights and resistance Set goals and track your progress   You don’t need to join a gym.

## (undated) NOTE — LETTER
7/19/2024          Eloy Sanchez        [address] [facility] [address]               Dear Eloy,    This letter is to inform you that our office has made several attempts to reach you by phone without success.  We were attempting to contact you by phone regarding prescription request.    Please contact our office at the number listed below as soon as you receive this letter to discuss this issue and to make the necessary changes in our system to your contact information.  Thank you for your cooperation.        Sincerely,    Garrison Li MD  [facility] MEDICAL [facility], [address] [place] [id number] [phone]